# Patient Record
Sex: MALE | Race: OTHER | HISPANIC OR LATINO | Employment: UNEMPLOYED | URBAN - NONMETROPOLITAN AREA
[De-identification: names, ages, dates, MRNs, and addresses within clinical notes are randomized per-mention and may not be internally consistent; named-entity substitution may affect disease eponyms.]

---

## 2024-06-11 ENCOUNTER — HOSPITAL ENCOUNTER (INPATIENT)
Facility: HOSPITAL | Age: 39
LOS: 4 days | Discharge: HOME | DRG: 415 | End: 2024-06-15
Attending: STUDENT IN AN ORGANIZED HEALTH CARE EDUCATION/TRAINING PROGRAM | Admitting: STUDENT IN AN ORGANIZED HEALTH CARE EDUCATION/TRAINING PROGRAM
Payer: MEDICAID

## 2024-06-11 ENCOUNTER — ANESTHESIA (OUTPATIENT)
Dept: SURGERY | Facility: HOSPITAL | Age: 39
DRG: 415 | End: 2024-06-11
Payer: MEDICAID

## 2024-06-11 ENCOUNTER — ANESTHESIA EVENT (OUTPATIENT)
Dept: SURGERY | Facility: HOSPITAL | Age: 39
DRG: 415 | End: 2024-06-11
Payer: MEDICAID

## 2024-06-11 DIAGNOSIS — E87.6 HYPOKALEMIA: ICD-10-CM

## 2024-06-11 DIAGNOSIS — E83.39 HYPOPHOSPHATEMIA: ICD-10-CM

## 2024-06-11 DIAGNOSIS — R79.89 ELEVATED LFTS: ICD-10-CM

## 2024-06-11 DIAGNOSIS — K82.A2 CHOLECYSTITIS WITH PERFORATION OF GALLBLADDER: Primary | ICD-10-CM

## 2024-06-11 DIAGNOSIS — K81.9 CHOLECYSTITIS: ICD-10-CM

## 2024-06-11 DIAGNOSIS — E11.9 TYPE 2 DIABETES MELLITUS WITHOUT COMPLICATION, WITHOUT LONG-TERM CURRENT USE OF INSULIN: ICD-10-CM

## 2024-06-11 PROBLEM — K82.A1 CHOLECYSTITIS WITH GANGRENE OF GALLBLADDER: Status: ACTIVE | Noted: 2024-06-11

## 2024-06-11 LAB
ALBUMIN SERPL BCP-MCNC: 2 G/DL (ref 3.5–5.2)
ALBUMIN SERPL BCP-MCNC: 3.2 G/DL (ref 3.5–5.2)
ALP SERPL-CCNC: 121 U/L (ref 55–135)
ALP SERPL-CCNC: 144 U/L (ref 55–135)
ALT SERPL W/O P-5'-P-CCNC: 240 U/L (ref 10–44)
ALT SERPL W/O P-5'-P-CCNC: 62 U/L (ref 10–44)
ANION GAP SERPL CALC-SCNC: 5 MMOL/L (ref 3–11)
ANION GAP SERPL CALC-SCNC: 7 MMOL/L (ref 3–11)
APTT PPP: 25.5 SEC (ref 21–32)
AST SERPL-CCNC: 17 U/L (ref 10–40)
AST SERPL-CCNC: 316 U/L (ref 10–40)
BACTERIA #/AREA URNS HPF: NEGATIVE /HPF
BASOPHILS # BLD AUTO: 0.02 K/UL (ref 0–0.2)
BASOPHILS NFR BLD: 0.3 % (ref 0–1.9)
BILIRUB SERPL-MCNC: 1.2 MG/DL (ref 0.1–1)
BILIRUB SERPL-MCNC: 1.3 MG/DL (ref 0.1–1)
BILIRUB UR QL STRIP: NEGATIVE
BUN SERPL-MCNC: 12 MG/DL (ref 6–20)
BUN SERPL-MCNC: 16 MG/DL (ref 6–20)
CALCIUM SERPL-MCNC: 6.6 MG/DL (ref 8.7–10.5)
CALCIUM SERPL-MCNC: 9.3 MG/DL (ref 8.7–10.5)
CHLORIDE SERPL-SCNC: 110 MMOL/L (ref 95–110)
CHLORIDE SERPL-SCNC: 96 MMOL/L (ref 95–110)
CLARITY UR: CLEAR
CO2 SERPL-SCNC: 20 MMOL/L (ref 23–29)
CO2 SERPL-SCNC: 26 MMOL/L (ref 23–29)
COLOR UR: YELLOW
CREAT SERPL-MCNC: 1 MG/DL (ref 0.5–1.4)
CREAT SERPL-MCNC: 1.2 MG/DL (ref 0.5–1.4)
DIFFERENTIAL METHOD BLD: ABNORMAL
EOSINOPHIL # BLD AUTO: 0 K/UL (ref 0–0.5)
EOSINOPHIL NFR BLD: 0.1 % (ref 0–8)
ERYTHROCYTE [DISTWIDTH] IN BLOOD BY AUTOMATED COUNT: 12.6 % (ref 11.5–14.5)
ERYTHROCYTE [DISTWIDTH] IN BLOOD BY AUTOMATED COUNT: 13.2 % (ref 11.5–14.5)
EST. GFR  (NO RACE VARIABLE): >60 ML/MIN/1.73 M^2
EST. GFR  (NO RACE VARIABLE): >60 ML/MIN/1.73 M^2
ESTIMATED AVG GLUCOSE: 266 MG/DL (ref 68–131)
GLUCOSE SERPL-MCNC: 323 MG/DL (ref 70–110)
GLUCOSE SERPL-MCNC: 333 MG/DL (ref 70–110)
GLUCOSE UR QL STRIP: ABNORMAL
HBA1C MFR BLD: 10.9 % (ref 4–5.6)
HCT VFR BLD AUTO: 37.1 % (ref 40–54)
HCT VFR BLD AUTO: 47.5 % (ref 40–54)
HGB BLD-MCNC: 12.6 G/DL (ref 14–18)
HGB BLD-MCNC: 16.4 G/DL (ref 14–18)
HGB UR QL STRIP: ABNORMAL
HYALINE CASTS #/AREA URNS LPF: 1.5 /LPF
IMM GRANULOCYTES # BLD AUTO: 0.14 K/UL (ref 0–0.04)
IMM GRANULOCYTES NFR BLD AUTO: 1.9 % (ref 0–0.5)
KETONES UR QL STRIP: ABNORMAL
LACTATE SERPL-SCNC: 1.4 MMOL/L (ref 0.5–2.2)
LEUKOCYTE ESTERASE UR QL STRIP: NEGATIVE
LIPASE SERPL-CCNC: 23 U/L (ref 13–75)
LYMPHOCYTES # BLD AUTO: 1.4 K/UL (ref 1–4.8)
LYMPHOCYTES NFR BLD: 18 % (ref 18–48)
MAGNESIUM SERPL-MCNC: 1.7 MG/DL (ref 1.6–2.6)
MCH RBC QN AUTO: 27.6 PG (ref 27–31)
MCH RBC QN AUTO: 27.8 PG (ref 27–31)
MCHC RBC AUTO-ENTMCNC: 34 G/DL (ref 32–36)
MCHC RBC AUTO-ENTMCNC: 34.5 G/DL (ref 32–36)
MCV RBC AUTO: 80 FL (ref 82–98)
MCV RBC AUTO: 82 FL (ref 82–98)
MICROSCOPIC COMMENT: ABNORMAL
MONOCYTES # BLD AUTO: 0.4 K/UL (ref 0.3–1)
MONOCYTES NFR BLD: 5.7 % (ref 4–15)
NEUTROPHILS # BLD AUTO: 5.5 K/UL (ref 1.8–7.7)
NEUTROPHILS NFR BLD: 74 % (ref 38–73)
NITRITE UR QL STRIP: NEGATIVE
NRBC BLD-RTO: 0 /100 WBC
PH UR STRIP: 6 [PH] (ref 5–8)
PHOSPHATE SERPL-MCNC: 2.5 MG/DL (ref 2.7–4.5)
PLATELET # BLD AUTO: 177 K/UL (ref 150–450)
PLATELET # BLD AUTO: 230 K/UL (ref 150–450)
PMV BLD AUTO: 10.6 FL (ref 9.2–12.9)
PMV BLD AUTO: 10.7 FL (ref 9.2–12.9)
POCT GLUCOSE: 295 MG/DL (ref 70–110)
POCT GLUCOSE: 313 MG/DL (ref 70–110)
POTASSIUM SERPL-SCNC: 4 MMOL/L (ref 3.5–5.1)
POTASSIUM SERPL-SCNC: 5.4 MMOL/L (ref 3.5–5.1)
PROT SERPL-MCNC: 5.7 G/DL (ref 6–8.4)
PROT SERPL-MCNC: 8.6 G/DL (ref 6–8.4)
PROT UR QL STRIP: ABNORMAL
RBC # BLD AUTO: 4.53 M/UL (ref 4.6–6.2)
RBC # BLD AUTO: 5.95 M/UL (ref 4.6–6.2)
RBC #/AREA URNS HPF: 3 /HPF (ref 0–4)
SODIUM SERPL-SCNC: 129 MMOL/L (ref 136–145)
SODIUM SERPL-SCNC: 135 MMOL/L (ref 136–145)
SP GR UR STRIP: >=1.03 (ref 1–1.03)
SQUAMOUS #/AREA URNS HPF: 1 /HPF
URN SPEC COLLECT METH UR: ABNORMAL
UROBILINOGEN UR STRIP-ACNC: 1 EU/DL
WBC # BLD AUTO: 10.65 K/UL (ref 3.9–12.7)
WBC # BLD AUTO: 7.48 K/UL (ref 3.9–12.7)
WBC #/AREA URNS HPF: 4 /HPF (ref 0–5)
YEAST URNS QL MICRO: ABNORMAL

## 2024-06-11 PROCEDURE — 63600175 PHARM REV CODE 636 W HCPCS: Performed by: STUDENT IN AN ORGANIZED HEALTH CARE EDUCATION/TRAINING PROGRAM

## 2024-06-11 PROCEDURE — 87075 CULTR BACTERIA EXCEPT BLOOD: CPT | Performed by: STUDENT IN AN ORGANIZED HEALTH CARE EDUCATION/TRAINING PROGRAM

## 2024-06-11 PROCEDURE — 83036 HEMOGLOBIN GLYCOSYLATED A1C: CPT | Performed by: STUDENT IN AN ORGANIZED HEALTH CARE EDUCATION/TRAINING PROGRAM

## 2024-06-11 PROCEDURE — 96375 TX/PRO/DX INJ NEW DRUG ADDON: CPT

## 2024-06-11 PROCEDURE — 25000003 PHARM REV CODE 250: Performed by: STUDENT IN AN ORGANIZED HEALTH CARE EDUCATION/TRAINING PROGRAM

## 2024-06-11 PROCEDURE — G0378 HOSPITAL OBSERVATION PER HR: HCPCS

## 2024-06-11 PROCEDURE — 96372 THER/PROPH/DIAG INJ SC/IM: CPT | Mod: 59 | Performed by: STUDENT IN AN ORGANIZED HEALTH CARE EDUCATION/TRAINING PROGRAM

## 2024-06-11 PROCEDURE — 85025 COMPLETE CBC W/AUTO DIFF WBC: CPT | Performed by: STUDENT IN AN ORGANIZED HEALTH CARE EDUCATION/TRAINING PROGRAM

## 2024-06-11 PROCEDURE — 0FB40ZZ EXCISION OF GALLBLADDER, OPEN APPROACH: ICD-10-PCS | Performed by: STUDENT IN AN ORGANIZED HEALTH CARE EDUCATION/TRAINING PROGRAM

## 2024-06-11 PROCEDURE — 36415 COLL VENOUS BLD VENIPUNCTURE: CPT | Performed by: STUDENT IN AN ORGANIZED HEALTH CARE EDUCATION/TRAINING PROGRAM

## 2024-06-11 PROCEDURE — 85027 COMPLETE CBC AUTOMATED: CPT | Performed by: STUDENT IN AN ORGANIZED HEALTH CARE EDUCATION/TRAINING PROGRAM

## 2024-06-11 PROCEDURE — 37000009 HC ANESTHESIA EA ADD 15 MINS: Performed by: STUDENT IN AN ORGANIZED HEALTH CARE EDUCATION/TRAINING PROGRAM

## 2024-06-11 PROCEDURE — 37000008 HC ANESTHESIA 1ST 15 MINUTES: Performed by: STUDENT IN AN ORGANIZED HEALTH CARE EDUCATION/TRAINING PROGRAM

## 2024-06-11 PROCEDURE — 36000708 HC OR TIME LEV III 1ST 15 MIN: Performed by: STUDENT IN AN ORGANIZED HEALTH CARE EDUCATION/TRAINING PROGRAM

## 2024-06-11 PROCEDURE — C1729 CATH, DRAINAGE: HCPCS | Performed by: STUDENT IN AN ORGANIZED HEALTH CARE EDUCATION/TRAINING PROGRAM

## 2024-06-11 PROCEDURE — 87070 CULTURE OTHR SPECIMN AEROBIC: CPT | Performed by: STUDENT IN AN ORGANIZED HEALTH CARE EDUCATION/TRAINING PROGRAM

## 2024-06-11 PROCEDURE — 99285 EMERGENCY DEPT VISIT HI MDM: CPT | Mod: 25

## 2024-06-11 PROCEDURE — 85730 THROMBOPLASTIN TIME PARTIAL: CPT | Performed by: STUDENT IN AN ORGANIZED HEALTH CARE EDUCATION/TRAINING PROGRAM

## 2024-06-11 PROCEDURE — 87077 CULTURE AEROBIC IDENTIFY: CPT | Performed by: STUDENT IN AN ORGANIZED HEALTH CARE EDUCATION/TRAINING PROGRAM

## 2024-06-11 PROCEDURE — 83605 ASSAY OF LACTIC ACID: CPT | Performed by: STUDENT IN AN ORGANIZED HEALTH CARE EDUCATION/TRAINING PROGRAM

## 2024-06-11 PROCEDURE — 87186 SC STD MICRODIL/AGAR DIL: CPT | Performed by: STUDENT IN AN ORGANIZED HEALTH CARE EDUCATION/TRAINING PROGRAM

## 2024-06-11 PROCEDURE — 25000003 PHARM REV CODE 250: Performed by: NURSE ANESTHETIST, CERTIFIED REGISTERED

## 2024-06-11 PROCEDURE — 83690 ASSAY OF LIPASE: CPT | Performed by: STUDENT IN AN ORGANIZED HEALTH CARE EDUCATION/TRAINING PROGRAM

## 2024-06-11 PROCEDURE — 25500020 PHARM REV CODE 255: Performed by: STUDENT IN AN ORGANIZED HEALTH CARE EDUCATION/TRAINING PROGRAM

## 2024-06-11 PROCEDURE — 27201423 OPTIME MED/SURG SUP & DEVICES STERILE SUPPLY: Performed by: STUDENT IN AN ORGANIZED HEALTH CARE EDUCATION/TRAINING PROGRAM

## 2024-06-11 PROCEDURE — 81000 URINALYSIS NONAUTO W/SCOPE: CPT | Performed by: STUDENT IN AN ORGANIZED HEALTH CARE EDUCATION/TRAINING PROGRAM

## 2024-06-11 PROCEDURE — 84100 ASSAY OF PHOSPHORUS: CPT | Performed by: STUDENT IN AN ORGANIZED HEALTH CARE EDUCATION/TRAINING PROGRAM

## 2024-06-11 PROCEDURE — 83735 ASSAY OF MAGNESIUM: CPT | Performed by: STUDENT IN AN ORGANIZED HEALTH CARE EDUCATION/TRAINING PROGRAM

## 2024-06-11 PROCEDURE — 80053 COMPREHEN METABOLIC PANEL: CPT | Mod: 91 | Performed by: STUDENT IN AN ORGANIZED HEALTH CARE EDUCATION/TRAINING PROGRAM

## 2024-06-11 PROCEDURE — 63600175 PHARM REV CODE 636 W HCPCS: Performed by: NURSE ANESTHETIST, CERTIFIED REGISTERED

## 2024-06-11 PROCEDURE — 47600 CHOLECYSTECTOMY: CPT | Mod: 52,,, | Performed by: STUDENT IN AN ORGANIZED HEALTH CARE EDUCATION/TRAINING PROGRAM

## 2024-06-11 PROCEDURE — 20000000 HC ICU ROOM

## 2024-06-11 PROCEDURE — 80053 COMPREHEN METABOLIC PANEL: CPT | Performed by: STUDENT IN AN ORGANIZED HEALTH CARE EDUCATION/TRAINING PROGRAM

## 2024-06-11 PROCEDURE — 36000709 HC OR TIME LEV III EA ADD 15 MIN: Performed by: STUDENT IN AN ORGANIZED HEALTH CARE EDUCATION/TRAINING PROGRAM

## 2024-06-11 PROCEDURE — 96374 THER/PROPH/DIAG INJ IV PUSH: CPT

## 2024-06-11 RX ORDER — DIPHENHYDRAMINE HYDROCHLORIDE 50 MG/ML
25 INJECTION INTRAMUSCULAR; INTRAVENOUS EVERY 6 HOURS PRN
OUTPATIENT
Start: 2024-06-11

## 2024-06-11 RX ORDER — HYDROMORPHONE HYDROCHLORIDE 2 MG/ML
0.5 INJECTION, SOLUTION INTRAMUSCULAR; INTRAVENOUS; SUBCUTANEOUS EVERY 5 MIN PRN
OUTPATIENT
Start: 2024-06-11

## 2024-06-11 RX ORDER — ACETAMINOPHEN 10 MG/ML
INJECTION, SOLUTION INTRAVENOUS
Status: DISCONTINUED | OUTPATIENT
Start: 2024-06-11 | End: 2024-06-12

## 2024-06-11 RX ORDER — HYDROMORPHONE HYDROCHLORIDE 1 MG/ML
0.5 INJECTION, SOLUTION INTRAMUSCULAR; INTRAVENOUS; SUBCUTANEOUS EVERY 4 HOURS PRN
Status: DISCONTINUED | OUTPATIENT
Start: 2024-06-11 | End: 2024-06-12

## 2024-06-11 RX ORDER — MORPHINE SULFATE 4 MG/ML
4 INJECTION, SOLUTION INTRAMUSCULAR; INTRAVENOUS
Status: COMPLETED | OUTPATIENT
Start: 2024-06-11 | End: 2024-06-11

## 2024-06-11 RX ORDER — SODIUM CHLORIDE 9 MG/ML
INJECTION, SOLUTION INTRAVENOUS CONTINUOUS
OUTPATIENT
Start: 2024-06-11

## 2024-06-11 RX ORDER — PROCHLORPERAZINE EDISYLATE 5 MG/ML
5 INJECTION INTRAMUSCULAR; INTRAVENOUS EVERY 6 HOURS PRN
Status: DISCONTINUED | OUTPATIENT
Start: 2024-06-11 | End: 2024-06-15 | Stop reason: HOSPADM

## 2024-06-11 RX ORDER — METHOCARBAMOL 500 MG/1
1000 TABLET, FILM COATED ORAL 4 TIMES DAILY
Status: DISCONTINUED | OUTPATIENT
Start: 2024-06-11 | End: 2024-06-15 | Stop reason: HOSPADM

## 2024-06-11 RX ORDER — DEXTROSE MONOHYDRATE 50 MG/ML
INJECTION, SOLUTION INTRAVENOUS
Status: DISCONTINUED | OUTPATIENT
Start: 2024-06-11 | End: 2024-06-15 | Stop reason: HOSPADM

## 2024-06-11 RX ORDER — FENTANYL CITRATE 50 UG/ML
INJECTION, SOLUTION INTRAMUSCULAR; INTRAVENOUS
Status: DISCONTINUED | OUTPATIENT
Start: 2024-06-11 | End: 2024-06-12

## 2024-06-11 RX ORDER — NEOSTIGMINE METHYLSULFATE 5 MG/5 ML
SYRINGE (ML) INTRAVENOUS
Status: DISCONTINUED | OUTPATIENT
Start: 2024-06-11 | End: 2024-06-12

## 2024-06-11 RX ORDER — GLYCOPYRROLATE 0.2 MG/ML
INJECTION, SOLUTION INTRAMUSCULAR; INTRAVENOUS
Status: DISCONTINUED | OUTPATIENT
Start: 2024-06-11 | End: 2024-06-12

## 2024-06-11 RX ORDER — SODIUM CHLORIDE 0.9 % (FLUSH) 0.9 %
10 SYRINGE (ML) INJECTION
Status: DISCONTINUED | OUTPATIENT
Start: 2024-06-11 | End: 2024-06-15 | Stop reason: HOSPADM

## 2024-06-11 RX ORDER — HYDROMORPHONE HYDROCHLORIDE 1 MG/ML
1 INJECTION, SOLUTION INTRAMUSCULAR; INTRAVENOUS; SUBCUTANEOUS EVERY 4 HOURS PRN
Status: DISCONTINUED | OUTPATIENT
Start: 2024-06-11 | End: 2024-06-12

## 2024-06-11 RX ORDER — MORPHINE SULFATE 4 MG/ML
4 INJECTION, SOLUTION INTRAMUSCULAR; INTRAVENOUS EVERY 5 MIN PRN
OUTPATIENT
Start: 2024-06-11

## 2024-06-11 RX ORDER — ONDANSETRON HYDROCHLORIDE 2 MG/ML
INJECTION, SOLUTION INTRAMUSCULAR; INTRAVENOUS
Status: DISCONTINUED | OUTPATIENT
Start: 2024-06-11 | End: 2024-06-12

## 2024-06-11 RX ORDER — ONDANSETRON HYDROCHLORIDE 2 MG/ML
4 INJECTION, SOLUTION INTRAVENOUS
Status: COMPLETED | OUTPATIENT
Start: 2024-06-11 | End: 2024-06-11

## 2024-06-11 RX ORDER — ONDANSETRON HYDROCHLORIDE 2 MG/ML
4 INJECTION, SOLUTION INTRAVENOUS EVERY 8 HOURS PRN
Status: DISCONTINUED | OUTPATIENT
Start: 2024-06-11 | End: 2024-06-15 | Stop reason: HOSPADM

## 2024-06-11 RX ORDER — FAMOTIDINE 10 MG/ML
20 INJECTION INTRAVENOUS EVERY 12 HOURS
Status: DISCONTINUED | OUTPATIENT
Start: 2024-06-11 | End: 2024-06-15 | Stop reason: HOSPADM

## 2024-06-11 RX ORDER — HYDROMORPHONE HYDROCHLORIDE 2 MG/ML
INJECTION, SOLUTION INTRAMUSCULAR; INTRAVENOUS; SUBCUTANEOUS
Status: DISCONTINUED | OUTPATIENT
Start: 2024-06-11 | End: 2024-06-12

## 2024-06-11 RX ORDER — PROPOFOL 10 MG/ML
VIAL (ML) INTRAVENOUS
Status: DISCONTINUED | OUTPATIENT
Start: 2024-06-11 | End: 2024-06-12

## 2024-06-11 RX ORDER — ROCURONIUM BROMIDE 10 MG/ML
INJECTION, SOLUTION INTRAVENOUS
Status: DISCONTINUED | OUTPATIENT
Start: 2024-06-11 | End: 2024-06-12

## 2024-06-11 RX ORDER — INSULIN ASPART 100 [IU]/ML
0-5 INJECTION, SOLUTION INTRAVENOUS; SUBCUTANEOUS EVERY 6 HOURS PRN
Status: DISCONTINUED | OUTPATIENT
Start: 2024-06-11 | End: 2024-06-12

## 2024-06-11 RX ORDER — ONDANSETRON HYDROCHLORIDE 2 MG/ML
4 INJECTION, SOLUTION INTRAVENOUS DAILY PRN
OUTPATIENT
Start: 2024-06-11

## 2024-06-11 RX ORDER — TALC
6 POWDER (GRAM) TOPICAL NIGHTLY PRN
Status: DISCONTINUED | OUTPATIENT
Start: 2024-06-11 | End: 2024-06-15 | Stop reason: HOSPADM

## 2024-06-11 RX ORDER — GLUCAGON 1 MG
1 KIT INJECTION
Status: DISCONTINUED | OUTPATIENT
Start: 2024-06-11 | End: 2024-06-12

## 2024-06-11 RX ORDER — SODIUM CHLORIDE 9 MG/ML
INJECTION, SOLUTION INTRAVENOUS CONTINUOUS
Status: DISCONTINUED | OUTPATIENT
Start: 2024-06-11 | End: 2024-06-14

## 2024-06-11 RX ADMIN — HYDROMORPHONE HYDROCHLORIDE 1 MG: 1 INJECTION, SOLUTION INTRAMUSCULAR; INTRAVENOUS; SUBCUTANEOUS at 08:06

## 2024-06-11 RX ADMIN — IOHEXOL 100 ML: 350 INJECTION, SOLUTION INTRAVENOUS at 05:06

## 2024-06-11 RX ADMIN — ONDANSETRON 4 MG: 2 INJECTION INTRAMUSCULAR; INTRAVENOUS at 05:06

## 2024-06-11 RX ADMIN — SODIUM CHLORIDE: 9 INJECTION, SOLUTION INTRAVENOUS at 02:06

## 2024-06-11 RX ADMIN — SODIUM CHLORIDE: 9 INJECTION, SOLUTION INTRAVENOUS at 05:06

## 2024-06-11 RX ADMIN — INSULIN ASPART 4 UNITS: 100 INJECTION, SOLUTION INTRAVENOUS; SUBCUTANEOUS at 11:06

## 2024-06-11 RX ADMIN — DEXTROSE MONOHYDRATE: 50 INJECTION, SOLUTION INTRAVENOUS at 06:06

## 2024-06-11 RX ADMIN — ROCURONIUM BROMIDE 20 MG: 10 INJECTION, SOLUTION INTRAVENOUS at 03:06

## 2024-06-11 RX ADMIN — ROCURONIUM BROMIDE 20 MG: 10 INJECTION, SOLUTION INTRAVENOUS at 06:06

## 2024-06-11 RX ADMIN — FAMOTIDINE 20 MG: 10 INJECTION, SOLUTION INTRAVENOUS at 08:06

## 2024-06-11 RX ADMIN — ROCURONIUM BROMIDE 30 MG: 10 INJECTION, SOLUTION INTRAVENOUS at 01:06

## 2024-06-11 RX ADMIN — SODIUM CHLORIDE: 9 INJECTION, SOLUTION INTRAVENOUS at 12:06

## 2024-06-11 RX ADMIN — ROCURONIUM BROMIDE 30 MG: 10 INJECTION, SOLUTION INTRAVENOUS at 02:06

## 2024-06-11 RX ADMIN — FENTANYL CITRATE 100 MCG: 50 INJECTION, SOLUTION INTRAMUSCULAR; INTRAVENOUS at 01:06

## 2024-06-11 RX ADMIN — HYDROMORPHONE HYDROCHLORIDE 1 MG: 1 INJECTION, SOLUTION INTRAMUSCULAR; INTRAVENOUS; SUBCUTANEOUS at 07:06

## 2024-06-11 RX ADMIN — INSULIN ASPART 3 UNITS: 100 INJECTION, SOLUTION INTRAVENOUS; SUBCUTANEOUS at 07:06

## 2024-06-11 RX ADMIN — ACETAMINOPHEN 1000 MG: 10 INJECTION, SOLUTION INTRAVENOUS at 02:06

## 2024-06-11 RX ADMIN — SODIUM CHLORIDE: 9 INJECTION, SOLUTION INTRAVENOUS at 01:06

## 2024-06-11 RX ADMIN — SODIUM CHLORIDE: 9 INJECTION, SOLUTION INTRAVENOUS at 09:06

## 2024-06-11 RX ADMIN — SODIUM CHLORIDE: 9 INJECTION, SOLUTION INTRAVENOUS at 08:06

## 2024-06-11 RX ADMIN — SODIUM CHLORIDE: 9 INJECTION, SOLUTION INTRAVENOUS at 03:06

## 2024-06-11 RX ADMIN — FENTANYL CITRATE 50 MCG: 50 INJECTION, SOLUTION INTRAMUSCULAR; INTRAVENOUS at 03:06

## 2024-06-11 RX ADMIN — GLYCOPYRROLATE 0.8 MG: 0.2 INJECTION, SOLUTION INTRAMUSCULAR; INTRAVENOUS at 07:06

## 2024-06-11 RX ADMIN — FENTANYL CITRATE 50 MCG: 50 INJECTION, SOLUTION INTRAMUSCULAR; INTRAVENOUS at 12:06

## 2024-06-11 RX ADMIN — ROCURONIUM BROMIDE 40 MG: 10 INJECTION, SOLUTION INTRAVENOUS at 12:06

## 2024-06-11 RX ADMIN — ONDANSETRON 4 MG: 2 INJECTION INTRAMUSCULAR; INTRAVENOUS at 12:06

## 2024-06-11 RX ADMIN — SODIUM CHLORIDE 1000 ML: 9 INJECTION, SOLUTION INTRAVENOUS at 05:06

## 2024-06-11 RX ADMIN — ROCURONIUM BROMIDE 20 MG: 10 INJECTION, SOLUTION INTRAVENOUS at 05:06

## 2024-06-11 RX ADMIN — Medication 200 MG: at 12:06

## 2024-06-11 RX ADMIN — Medication 5 MG: at 07:06

## 2024-06-11 RX ADMIN — ROCURONIUM BROMIDE 20 MG: 10 INJECTION, SOLUTION INTRAVENOUS at 04:06

## 2024-06-11 RX ADMIN — HYDROMORPHONE HYDROCHLORIDE 0.5 MG: 2 INJECTION, SOLUTION INTRAMUSCULAR; INTRAVENOUS; SUBCUTANEOUS at 05:06

## 2024-06-11 RX ADMIN — MORPHINE SULFATE 4 MG: 4 INJECTION INTRAVENOUS at 05:06

## 2024-06-11 RX ADMIN — PIPERACILLIN SODIUM AND TAZOBACTAM SODIUM 4.5 G: 4; .5 INJECTION, POWDER, FOR SOLUTION INTRAVENOUS at 06:06

## 2024-06-11 RX ADMIN — METHOCARBAMOL 1000 MG: 500 TABLET ORAL at 08:06

## 2024-06-11 RX ADMIN — HYDROMORPHONE HYDROCHLORIDE 1 MG: 1 INJECTION, SOLUTION INTRAMUSCULAR; INTRAVENOUS; SUBCUTANEOUS at 10:06

## 2024-06-11 RX ADMIN — PIPERACILLIN SODIUM AND TAZOBACTAM SODIUM 4.5 G: 4; .5 INJECTION, POWDER, FOR SOLUTION INTRAVENOUS at 02:06

## 2024-06-11 RX ADMIN — FENTANYL CITRATE 100 MCG: 50 INJECTION, SOLUTION INTRAMUSCULAR; INTRAVENOUS at 12:06

## 2024-06-11 RX ADMIN — HYDROMORPHONE HYDROCHLORIDE 0.5 MG: 2 INJECTION, SOLUTION INTRAMUSCULAR; INTRAVENOUS; SUBCUTANEOUS at 06:06

## 2024-06-11 RX ADMIN — FENTANYL CITRATE 100 MCG: 50 INJECTION, SOLUTION INTRAMUSCULAR; INTRAVENOUS at 02:06

## 2024-06-11 RX ADMIN — PIPERACILLIN SODIUM AND TAZOBACTAM SODIUM 4.5 G: 4; .5 INJECTION, POWDER, FOR SOLUTION INTRAVENOUS at 10:06

## 2024-06-11 RX ADMIN — ROCURONIUM BROMIDE 30 MG: 10 INJECTION, SOLUTION INTRAVENOUS at 12:06

## 2024-06-11 NOTE — ED PROVIDER NOTES
History  Chief Complaint   Patient presents with    Abdominal Pain     Patient reports 10/10, sharp, RLQ pain onset 2 hours ago. Denies N/V/D.      39-year-old male with history of gastritis presents for evaluation of severe right lower quadrant abdominal pain.  Pain rated 10 of 10. Omeprazole taken for symptom relief prior to arrival with no improvement.  No history of abdominal surgeries reported.  All other systems reviewed unremarkable        Past Medical History:   Diagnosis Date    Gastritis        History reviewed. No pertinent surgical history.    No family history on file.         ROS  Review of Systems   Gastrointestinal:  Positive for abdominal pain.       Physical Exam  /76 (BP Location: Left arm, Patient Position: Sitting)   Pulse 88   Temp 99.5 °F (37.5 °C) (Oral)   Resp 16   Wt 106.1 kg (234 lb)   SpO2 95%   Physical Exam    Constitutional: He appears well-developed and well-nourished. He is cooperative.   HENT:   Head: Normocephalic and atraumatic.   Eyes: Conjunctivae, EOM and lids are normal. Pupils are equal, round, and reactive to light.   Neck: Phonation normal.   Normal range of motion.  Cardiovascular:  Normal rate, regular rhythm and intact distal pulses.           Abdominal: There is abdominal tenderness.   Genitourinary:    Testes and penis normal.   Cremasteric reflex is present. Right testis shows no tenderness. Left testis shows no tenderness. No discharge found.   Musculoskeletal:      Cervical back: Normal range of motion.     Neurological: He is alert and oriented to person, place, and time.   Skin: Skin is warm and dry.   Psychiatric: He has a normal mood and affect. His speech is normal and behavior is normal.               Labs Reviewed   CBC W/ AUTO DIFFERENTIAL - Abnormal; Notable for the following components:       Result Value    MCV 80 (*)     Immature Granulocytes 1.9 (*)     Immature Grans (Abs) 0.14 (*)     Gran % 74.0 (*)     All other components within normal  limits   COMPREHENSIVE METABOLIC PANEL - Abnormal; Notable for the following components:    Sodium 129 (*)     Glucose 333 (*)     Total Protein 8.6 (*)     Albumin 3.2 (*)     Total Bilirubin 1.2 (*)     Alkaline Phosphatase 144 (*)     ALT 62 (*)     All other components within normal limits   LIPASE   URINALYSIS, REFLEX TO URINE CULTURE           Imaging Results              CT Abdomen Pelvis With IV Contrast NO Oral Contrast (In process)  Result time 06/11/24 05:16:12                                Procedures             Medical Decision Making  Differentials include appendicitis, nephrolithiasis, cystitis, testicular torsion or alternate abnormality.  Will obtain labs and imaging.  See ED course for updates    Amount and/or Complexity of Data Reviewed  Labs: ordered. Decision-making details documented in ED Course.  Radiology: ordered.    Risk  Prescription drug management.               ED Course as of 06/11/24 0608 Tue Jun 11, 2024   0555 BILIRUBIN TOTAL(!): 1.2 [NA]   0555 ALP(!): 144 [NA]   0555 AST: 17 [NA]   0555 ALT(!): 62 [NA]   0608 CT notable for suppurative cholecystitis.  General surgery, Dr. Castellanos was consulted.  Will admit for operative intervention. [NA]      ED Course User Index  [NA] Queenie Leonardo MD       DISCHARGE NOTE:       Justino Fortune's  results have been reviewed with him.  He has been counseled regarding his diagnosis, treatment, and plan.  He verbally conveys understanding and agreement of the signs, symptoms, diagnosis, treatment and prognosis and additionally agrees to follow up as discussed.  He also agrees with the care-plan and conveys that all of his questions have been answered.  I have also provided discharge instructions for him that include: educational information regarding their diagnosis and treatment, and list of reasons why they would want to return to the ED prior to their follow-up appointment, should his condition change. He has been provided with  education for proper emergency department utilization.      CLINICAL IMPRESSION:         1. Cholecystitis              PLAN:   1. Admit  2.   New Prescriptions    No medications on file     3. No follow-up provider specified.        Queenie Leonardo MD  06/11/24 0608

## 2024-06-11 NOTE — SUBJECTIVE & OBJECTIVE
No current facility-administered medications on file prior to encounter.     No current outpatient medications on file prior to encounter.       Review of patient's allergies indicates:  No Known Allergies    Past Medical History:   Diagnosis Date    Gastritis      History reviewed. No pertinent surgical history.  Family History    None       Tobacco Use    Smoking status: Not on file    Smokeless tobacco: Not on file   Substance and Sexual Activity    Alcohol use: Not on file    Drug use: Not on file    Sexual activity: Not on file     Review of Systems   Constitutional:  Negative for activity change, appetite change, chills and fever.   Respiratory:  Negative for chest tightness and shortness of breath.    Cardiovascular:  Negative for chest pain.   Gastrointestinal:  Positive for abdominal pain, nausea and vomiting. Negative for abdominal distention, anal bleeding, blood in stool, constipation, diarrhea and rectal pain.     Objective:     Vital Signs (Most Recent):  Temp: (!) 101.3 °F (38.5 °C) (06/11/24 0923)  Pulse: 103 (06/11/24 0923)  Resp: 20 (06/11/24 0923)  BP: 119/74 (06/11/24 0923)  SpO2: (!) 93 % (06/11/24 0923) Vital Signs (24h Range):  Temp:  [99.5 °F (37.5 °C)-101.3 °F (38.5 °C)] 101.3 °F (38.5 °C)  Pulse:  [] 103  Resp:  [16-20] 20  SpO2:  [93 %-95 %] 93 %  BP: (119-124)/(74-76) 119/74     Weight: 106.1 kg (234 lb)  Body mass index is 35.58 kg/m².     Physical Exam  Vitals reviewed.   Constitutional:       General: He is not in acute distress.     Appearance: He is ill-appearing. He is not toxic-appearing.   Cardiovascular:      Rate and Rhythm: Normal rate and regular rhythm.   Pulmonary:      Effort: Pulmonary effort is normal. No respiratory distress.   Abdominal:      General: There is no distension.      Palpations: Abdomen is soft.      Tenderness: There is abdominal tenderness. There is guarding. There is no rebound.   Musculoskeletal:      Right lower leg: No edema.      Left lower  leg: No edema.   Skin:     General: Skin is warm and dry.      Capillary Refill: Capillary refill takes less than 2 seconds.   Neurological:      Mental Status: He is alert. Mental status is at baseline.            I have reviewed all pertinent lab results within the past 24 hours.  CBC:   Recent Labs   Lab 06/11/24  0511   WBC 7.48   RBC 5.95   HGB 16.4   HCT 47.5      MCV 80*   MCH 27.6   MCHC 34.5     CMP:   Recent Labs   Lab 06/11/24  0511   *   CALCIUM 9.3   ALBUMIN 3.2*   PROT 8.6*   *   K 4.0   CO2 26   CL 96   BUN 12   CREATININE 1.2   ALKPHOS 144*   ALT 62*   AST 17   BILITOT 1.2*       Significant Diagnostics:  I have reviewed all pertinent imaging results/findings within the past 24 hours.

## 2024-06-11 NOTE — ANESTHESIA PREPROCEDURE EVALUATION
06/11/2024  Justino Fortune is a 39 y.o., male.      Pre-op Assessment    I have reviewed the Patient Summary Reports.    I have reviewed the NPO Status.   I have reviewed the Medications.     Review of Systems  Anesthesia Hx:  No problems with previous Anesthesia             Denies Family Hx of Anesthesia complications.    Denies Personal Hx of Anesthesia complications.                    Social:  Non-Smoker       Cardiovascular:  Cardiovascular Normal                                            Pulmonary:  Pulmonary Normal                       Renal/:  Renal/ Normal                 Hepatic/GI:  Hepatic/GI Normal       HX GAstritis          Neurological:  Neurology Normal                                      Endocrine:  Endocrine Normal              Lab Results   Component Value Date    WBC 7.48 06/11/2024    HGB 16.4 06/11/2024    HCT 47.5 06/11/2024    MCV 80 (L) 06/11/2024     06/11/2024      CMP  Sodium   Date Value Ref Range Status   06/11/2024 129 (L) 136 - 145 mmol/L Final     Potassium   Date Value Ref Range Status   06/11/2024 4.0 3.5 - 5.1 mmol/L Final     Chloride   Date Value Ref Range Status   06/11/2024 96 95 - 110 mmol/L Final     CO2   Date Value Ref Range Status   06/11/2024 26 23 - 29 mmol/L Final     Glucose   Date Value Ref Range Status   06/11/2024 333 (H) 70 - 110 mg/dL Final     BUN   Date Value Ref Range Status   06/11/2024 12 6 - 20 mg/dL Final     Creatinine   Date Value Ref Range Status   06/11/2024 1.2 0.5 - 1.4 mg/dL Final     Calcium   Date Value Ref Range Status   06/11/2024 9.3 8.7 - 10.5 mg/dL Final     Total Protein   Date Value Ref Range Status   06/11/2024 8.6 (H) 6.0 - 8.4 g/dL Final     Albumin   Date Value Ref Range Status   06/11/2024 3.2 (L) 3.5 - 5.2 g/dL Final     Total Bilirubin   Date Value Ref Range Status   06/11/2024 1.2 (H) 0.1 - 1.0 mg/dL  Final     Comment:     For infants and newborns, interpretation of results should be based  on gestational age, weight and in agreement with clinical  observations.    Premature Infant recommended reference ranges:  Up to 24 hours.............<8.0 mg/dL  Up to 48 hours............<12.0 mg/dL  3-5 days..................<15.0 mg/dL  6-29 days.................<15.0 mg/dL    For patients on Eltrombopag therapy, use of Dimension [place] TBIL is   not   recommended.       Alkaline Phosphatase   Date Value Ref Range Status   06/11/2024 144 (H) 55 - 135 U/L Final     AST   Date Value Ref Range Status   06/11/2024 17 10 - 40 U/L Final     ALT   Date Value Ref Range Status   06/11/2024 62 (H) 10 - 44 U/L Final     Anion Gap   Date Value Ref Range Status   06/11/2024 7 3 - 11 mmol/L Final     eGFR   Date Value Ref Range Status   06/11/2024 >60.0 >60 mL/min/1.73 m^2 Final    iNTERRURTER Kyle-friend       Anesthesia Plan  Type of Anesthesia, risks & benefits discussed:    Anesthesia Type: Gen ETT  Intra-op Monitoring Plan: Standard ASA Monitors  Post Op Pain Control Plan: multimodal analgesia  Induction:  IV  Airway Plan: Direct  Informed Consent: Informed consent signed with the Patient and all parties understand the risks and agree with anesthesia plan.  All questions answered.   ASA Score: 2  Day of Surgery Review of History & Physical: I have interviewed and examined the patient. I have reviewed the patient's H&P dated: There are no significant changes.     Ready For Surgery From Anesthesia Perspective.     .

## 2024-06-11 NOTE — H&P
[facility] Surg  General Surgery  History & Physical    Patient Name: Justino Fortune  MRN: [id number]  Admission Date: 6/11/2024  Attending Physician: Kary Ackerman MD   Primary Care Provider: No primary care provider on file.    Patient information was obtained from patient and ER records.     Subjective:     Chief Complaint/Reason for Admission: acute suppurative cholecystitis     History of Present Illness: 39-year-old male who presents with 2 day history of severe right upper quadrant pain and nausea.  Has history of self-reported gastritis but thinks it may have been his gallbladder.  In emergency department, CT abdomen and pelvis demonstrated supra active cholecystitis with inflammation of the adjacent liver.  WBC within normal limits.  T bili 1.2.  ALP elevated; glucose 333 - no reported history of diabetes.  General surgery consulted for evaluation.    No current facility-administered medications on file prior to encounter.     No current outpatient medications on file prior to encounter.       Review of patient's allergies indicates:  No Known Allergies    Past Medical History:   Diagnosis Date    Gastritis      History reviewed. No pertinent surgical history.  Family History    None       Tobacco Use    Smoking status: Not on file    Smokeless tobacco: Not on file   Substance and Sexual Activity    Alcohol use: Not on file    Drug use: Not on file    Sexual activity: Not on file     Review of Systems   Constitutional:  Negative for activity change, appetite change, chills and fever.   Respiratory:  Negative for chest tightness and shortness of breath.    Cardiovascular:  Negative for chest pain.   Gastrointestinal:  Positive for abdominal pain, nausea and vomiting. Negative for abdominal distention, anal bleeding, blood in stool, constipation, diarrhea and rectal pain.     Objective:     Vital Signs (Most Recent):  Temp: (!) 101.3 °F (38.5 °C) (06/11/24 0923)  Pulse: 103 (06/11/24  0923)  Resp: 20 (06/11/24 0923)  BP: 119/74 (06/11/24 0923)  SpO2: (!) 93 % (06/11/24 0923) Vital Signs (24h Range):  Temp:  [99.5 °F (37.5 °C)-101.3 °F (38.5 °C)] 101.3 °F (38.5 °C)  Pulse:  [] 103  Resp:  [16-20] 20  SpO2:  [93 %-95 %] 93 %  BP: (119-124)/(74-76) 119/74     Weight: 106.1 kg (234 lb)  Body mass index is 35.58 kg/m².     Physical Exam  Vitals reviewed.   Constitutional:       General: He is not in acute distress.     Appearance: He is ill-appearing. He is not toxic-appearing.   Cardiovascular:      Rate and Rhythm: Normal rate and regular rhythm.   Pulmonary:      Effort: Pulmonary effort is normal. No respiratory distress.   Abdominal:      General: There is no distension.      Palpations: Abdomen is soft.      Tenderness: There is abdominal tenderness. There is guarding. There is no rebound.   Musculoskeletal:      Right lower leg: No edema.      Left lower leg: No edema.   Skin:     General: Skin is warm and dry.      Capillary Refill: Capillary refill takes less than 2 seconds.   Neurological:      Mental Status: He is alert. Mental status is at baseline.            I have reviewed all pertinent lab results within the past 24 hours.  CBC:   Recent Labs   Lab 06/11/24  0511   WBC 7.48   RBC 5.95   HGB 16.4   HCT 47.5      MCV 80*   MCH 27.6   MCHC 34.5     CMP:   Recent Labs   Lab 06/11/24  0511   *   CALCIUM 9.3   ALBUMIN 3.2*   PROT 8.6*   *   K 4.0   CO2 26   CL 96   BUN 12   CREATININE 1.2   ALKPHOS 144*   ALT 62*   AST 17   BILITOT 1.2*       Significant Diagnostics:  I have reviewed all pertinent imaging results/findings within the past 24 hours.    Assessment/Plan:     Cholecystitis with gangrene of gallbladder  -To OR today for lap vs open cholecystectomy with possible drainage of liver abscess   -IV Zosyn   -NPO   -IVFs  -Multi modal pain control   -A1c  -SSI         VTE Risk Mitigation (From admission, onward)           Ordered     IP VTE LOW RISK PATIENT  Once          06/11/24 0651     Place JORJE hose  Until discontinued         06/11/24 0651                    Kary Ackerman MD  General Surgery  [facility] Surg

## 2024-06-11 NOTE — PLAN OF CARE
[facility] Surg  Initial Discharge Assessment       Primary Care Provider: No primary care provider on file.    Admission Diagnosis: Cholecystitis [K81.9]    Admission Date: 6/11/2024  Expected Discharge Date:     Transition of Care Barriers: Unisured    Payor: /     Extended Emergency Contact Information  Primary Emergency Contact: NNEKA HSU  Mobile Phone: [phone]  Relation: Relative  Preferred language: Greek   needed? No    Discharge Plan A: Home  Discharge Plan B: Home      [id number] - [place], [place] - [address] [facility] [address]  [address]  [facility] [address]  Phone: [phone] Fax: [phone]      Initial Assessment (most recent)       Adult Discharge Assessment - 06/11/24 0912          Discharge Assessment    Assessment Type Discharge Planning Assessment     Confirmed/corrected address, phone number and insurance Yes     Confirmed Demographics Correct on Facesheet     Source of Information patient     When was your last doctors appointment? --   Has been a while    Does patient/caregiver understand observation status Yes     Communicated ROMEO with patient/caregiver Yes     Reason For Admission Abdominal pain     People in Home friend(s)   4 friends living together    Do you expect to return to your current living situation? Yes     Do you have help at home or someone to help you manage your care at home? No     Prior to hospitilization cognitive status: Alert/Oriented     Current cognitive status: Alert/Oriented     Walking or Climbing Stairs Difficulty no   Patient is independent    Dressing/Bathing Difficulty no   Patient is independent    Equipment Currently Used at Home none     Readmission within 30 days? No     Patient currently being followed by outpatient case management? No     Do you currently have service(s) that help you manage your care at home? No     Do you take prescription medications? No     Do you have  prescription coverage? No     Do you have any problems affording any of your prescribed medications? TBD     Who is going to help you get home at discharge? Patient has friends that can assist if needed.     How do you get to doctors appointments? family or friend will provide     Are you on dialysis? No     Do you take coumadin? No     Discharge Plan A Home     Discharge Plan B Home     DME Needed Upon Discharge  none     Discharge Plan discussed with: Patient     Transition of Care Barriers Unisured        Physical Activity    On average, how many days per week do you engage in moderate to strenuous exercise (like a brisk walk)? 5 days     On average, how many minutes do you engage in exercise at this level? 130 min        Financial Resource Strain    How hard is it for you to pay for the very basics like food, housing, medical care, and heating? Somewhat hard   Sometimes he is unemployed.       Housing Stability    In the last 12 months, was there a time when you were not able to pay the mortgage or rent on time? No     At any time in the past 12 months, were you homeless or living in a shelter (including now)? No        Transportation Needs    Has the lack of transportation kept you from medical appointments, meetings, work or from getting things needed for daily living? No        Food Insecurity    Within the past 12 months, you worried that your food would run out before you got the money to buy more. Sometimes true        Stress    Do you feel stress - tense, restless, nervous, or anxious, or unable to sleep at night because your mind is troubled all the time - these days? To some extent        Social Isolation    How often do you feel lonely or isolated from those around you?  Never        Alcohol Use    Q1: How often do you have a drink containing alcohol? Never     Q2: How many drinks containing alcohol do you have on a typical day when you are drinking? Patient does not drink     Q3: How often do you have  six or more drinks on one occasion? Never        Utilities    In the past 12 months has the electric, gas, oil, or water company threatened to shut off services in your home? No        Health Literacy    How often do you need to have someone help you when you read instructions, pamphlets, or other written material from your doctor or pharmacy? Always                      DCP completed at bedside. Patient is independent and sometimes work. Patient has no support other than 3 friends that live with him but do not assist in his  care. Patient is uninsured and with reach out to Medicaid rep to assist in the case.  No other social concerns noted. CM/SW to remain available for any needs. Friend at bedside that is assisting patient is Oliva Dominguez [phone]

## 2024-06-11 NOTE — ANESTHESIA PROCEDURE NOTES
Intubation    Date/Time: 6/11/2024 12:29 PM    Performed by: Flaco Acosta CRNA  Authorized by: Flaco Acosta CRNA    Intubation:     Induction:  Intravenous    Intubated:  Postinduction    Mask Ventilation:  Not attempted    Attempts:  1    Attempted By:  CRNA    Method of Intubation:  Direct    Blade:  Johny 4    Laryngeal View Grade: Grade I - full view of cords      Difficult Airway Encountered?: No      Complications:  None    Airway Device:  Oral endotracheal tube    Airway Device Size:  7.5    Style/Cuff Inflation:  Cuffed    Inflation Amount (mL):  6    Tube secured:  21    Secured at:  The lips    Placement Verified By:  Capnometry    Complicating Factors:  None    Findings Post-Intubation:  BS equal bilateral and atraumatic/condition of teeth unchanged

## 2024-06-11 NOTE — ASSESSMENT & PLAN NOTE
-To OR today for lap vs open cholecystectomy with possible drainage of liver abscess   -IV Zosyn   -NPO   -IVFs  -Multi modal pain control   -A1c  -SSI

## 2024-06-11 NOTE — HPI
39-year-old male who presents with 2 day history of severe right upper quadrant pain and nausea.  Has history of self-reported gastritis but thinks it may have been his gallbladder.  In emergency department, CT abdomen and pelvis demonstrated supra active cholecystitis with inflammation of the adjacent liver.  WBC within normal limits.  T bili 1.2.  ALP elevated; glucose 333 - no reported history of diabetes.  General surgery consulted for evaluation.

## 2024-06-12 PROBLEM — E11.9 TYPE 2 DIABETES MELLITUS WITHOUT COMPLICATION, WITHOUT LONG-TERM CURRENT USE OF INSULIN: Status: ACTIVE | Noted: 2024-06-12

## 2024-06-12 LAB
ALBUMIN SERPL BCP-MCNC: 1.8 G/DL (ref 3.5–5.2)
ALP SERPL-CCNC: 111 U/L (ref 55–135)
ALT SERPL W/O P-5'-P-CCNC: 289 U/L (ref 10–44)
ANION GAP SERPL CALC-SCNC: 6 MMOL/L (ref 3–11)
AST SERPL-CCNC: 285 U/L (ref 10–40)
BILIRUB SERPL-MCNC: 0.9 MG/DL (ref 0.1–1)
BUN SERPL-MCNC: 12 MG/DL (ref 6–20)
CALCIUM SERPL-MCNC: 7.1 MG/DL (ref 8.7–10.5)
CHLORIDE SERPL-SCNC: 103 MMOL/L (ref 95–110)
CO2 SERPL-SCNC: 24 MMOL/L (ref 23–29)
CREAT SERPL-MCNC: 0.8 MG/DL (ref 0.5–1.4)
ERYTHROCYTE [DISTWIDTH] IN BLOOD BY AUTOMATED COUNT: 13.2 % (ref 11.5–14.5)
EST. GFR  (NO RACE VARIABLE): >60 ML/MIN/1.73 M^2
GLUCOSE SERPL-MCNC: 240 MG/DL (ref 70–110)
HCT VFR BLD AUTO: 35.5 % (ref 40–54)
HGB BLD-MCNC: 11.7 G/DL (ref 14–18)
MAGNESIUM SERPL-MCNC: 1.6 MG/DL (ref 1.6–2.6)
MCH RBC QN AUTO: 27.2 PG (ref 27–31)
MCHC RBC AUTO-ENTMCNC: 33 G/DL (ref 32–36)
MCV RBC AUTO: 83 FL (ref 82–98)
PHOSPHATE SERPL-MCNC: 2 MG/DL (ref 2.7–4.5)
PLATELET # BLD AUTO: 169 K/UL (ref 150–450)
PMV BLD AUTO: 10.8 FL (ref 9.2–12.9)
POCT GLUCOSE: 219 MG/DL (ref 70–110)
POCT GLUCOSE: 227 MG/DL (ref 70–110)
POCT GLUCOSE: 231 MG/DL (ref 70–110)
POCT GLUCOSE: 248 MG/DL (ref 70–110)
POCT GLUCOSE: 259 MG/DL (ref 70–110)
POTASSIUM SERPL-SCNC: 3.8 MMOL/L (ref 3.5–5.1)
PROT SERPL-MCNC: 5.4 G/DL (ref 6–8.4)
RBC # BLD AUTO: 4.3 M/UL (ref 4.6–6.2)
SODIUM SERPL-SCNC: 133 MMOL/L (ref 136–145)
WBC # BLD AUTO: 9.77 K/UL (ref 3.9–12.7)

## 2024-06-12 PROCEDURE — 99900031 HC PATIENT EDUCATION (STAT)

## 2024-06-12 PROCEDURE — 36415 COLL VENOUS BLD VENIPUNCTURE: CPT | Performed by: STUDENT IN AN ORGANIZED HEALTH CARE EDUCATION/TRAINING PROGRAM

## 2024-06-12 PROCEDURE — 25000003 PHARM REV CODE 250: Performed by: STUDENT IN AN ORGANIZED HEALTH CARE EDUCATION/TRAINING PROGRAM

## 2024-06-12 PROCEDURE — 94761 N-INVAS EAR/PLS OXIMETRY MLT: CPT

## 2024-06-12 PROCEDURE — 63600175 PHARM REV CODE 636 W HCPCS: Performed by: STUDENT IN AN ORGANIZED HEALTH CARE EDUCATION/TRAINING PROGRAM

## 2024-06-12 PROCEDURE — 80053 COMPREHEN METABOLIC PANEL: CPT | Performed by: STUDENT IN AN ORGANIZED HEALTH CARE EDUCATION/TRAINING PROGRAM

## 2024-06-12 PROCEDURE — 20000000 HC ICU ROOM

## 2024-06-12 PROCEDURE — 83735 ASSAY OF MAGNESIUM: CPT | Performed by: STUDENT IN AN ORGANIZED HEALTH CARE EDUCATION/TRAINING PROGRAM

## 2024-06-12 PROCEDURE — 99900035 HC TECH TIME PER 15 MIN (STAT)

## 2024-06-12 PROCEDURE — 84100 ASSAY OF PHOSPHORUS: CPT | Performed by: STUDENT IN AN ORGANIZED HEALTH CARE EDUCATION/TRAINING PROGRAM

## 2024-06-12 PROCEDURE — 27000221 HC OXYGEN, UP TO 24 HOURS

## 2024-06-12 PROCEDURE — 85027 COMPLETE CBC AUTOMATED: CPT | Performed by: STUDENT IN AN ORGANIZED HEALTH CARE EDUCATION/TRAINING PROGRAM

## 2024-06-12 RX ORDER — MORPHINE SULFATE 4 MG/ML
4 INJECTION, SOLUTION INTRAMUSCULAR; INTRAVENOUS EVERY 4 HOURS PRN
Status: DISCONTINUED | OUTPATIENT
Start: 2024-06-12 | End: 2024-06-15 | Stop reason: HOSPADM

## 2024-06-12 RX ORDER — INSULIN ASPART 100 [IU]/ML
0-10 INJECTION, SOLUTION INTRAVENOUS; SUBCUTANEOUS EVERY 6 HOURS PRN
Status: DISCONTINUED | OUTPATIENT
Start: 2024-06-12 | End: 2024-06-15 | Stop reason: HOSPADM

## 2024-06-12 RX ORDER — HYDROCODONE BITARTRATE AND ACETAMINOPHEN 10; 325 MG/1; MG/1
1 TABLET ORAL EVERY 4 HOURS PRN
Status: DISCONTINUED | OUTPATIENT
Start: 2024-06-12 | End: 2024-06-15 | Stop reason: HOSPADM

## 2024-06-12 RX ORDER — MUPIROCIN 20 MG/G
OINTMENT TOPICAL 2 TIMES DAILY
Status: DISCONTINUED | OUTPATIENT
Start: 2024-06-12 | End: 2024-06-15 | Stop reason: HOSPADM

## 2024-06-12 RX ORDER — GLUCAGON 1 MG
1 KIT INJECTION
Status: DISCONTINUED | OUTPATIENT
Start: 2024-06-12 | End: 2024-06-15 | Stop reason: HOSPADM

## 2024-06-12 RX ORDER — KETOROLAC TROMETHAMINE 30 MG/ML
15 INJECTION, SOLUTION INTRAMUSCULAR; INTRAVENOUS EVERY 6 HOURS
Status: COMPLETED | OUTPATIENT
Start: 2024-06-12 | End: 2024-06-15

## 2024-06-12 RX ADMIN — PIPERACILLIN SODIUM AND TAZOBACTAM SODIUM 4.5 G: 4; .5 INJECTION, POWDER, FOR SOLUTION INTRAVENOUS at 06:06

## 2024-06-12 RX ADMIN — METHOCARBAMOL 1000 MG: 500 TABLET ORAL at 08:06

## 2024-06-12 RX ADMIN — PIPERACILLIN SODIUM AND TAZOBACTAM SODIUM 4.5 G: 4; .5 INJECTION, POWDER, FOR SOLUTION INTRAVENOUS at 02:06

## 2024-06-12 RX ADMIN — MUPIROCIN: 20 OINTMENT TOPICAL at 08:06

## 2024-06-12 RX ADMIN — SODIUM CHLORIDE: 9 INJECTION, SOLUTION INTRAVENOUS at 10:06

## 2024-06-12 RX ADMIN — FAMOTIDINE 20 MG: 10 INJECTION, SOLUTION INTRAVENOUS at 08:06

## 2024-06-12 RX ADMIN — HYDROMORPHONE HYDROCHLORIDE 1 MG: 1 INJECTION, SOLUTION INTRAMUSCULAR; INTRAVENOUS; SUBCUTANEOUS at 03:06

## 2024-06-12 RX ADMIN — HYDROMORPHONE HYDROCHLORIDE 1 MG: 1 INJECTION, SOLUTION INTRAMUSCULAR; INTRAVENOUS; SUBCUTANEOUS at 09:06

## 2024-06-12 RX ADMIN — INSULIN ASPART 2 UNITS: 100 INJECTION, SOLUTION INTRAVENOUS; SUBCUTANEOUS at 05:06

## 2024-06-12 RX ADMIN — MORPHINE SULFATE 4 MG: 4 INJECTION, SOLUTION INTRAMUSCULAR; INTRAVENOUS at 03:06

## 2024-06-12 RX ADMIN — PROCHLORPERAZINE EDISYLATE 5 MG: 5 INJECTION INTRAMUSCULAR; INTRAVENOUS at 02:06

## 2024-06-12 RX ADMIN — METHOCARBAMOL 1000 MG: 500 TABLET ORAL at 05:06

## 2024-06-12 RX ADMIN — PIPERACILLIN SODIUM AND TAZOBACTAM SODIUM 4.5 G: 4; .5 INJECTION, POWDER, FOR SOLUTION INTRAVENOUS at 09:06

## 2024-06-12 RX ADMIN — KETOROLAC TROMETHAMINE 15 MG: 30 INJECTION, SOLUTION INTRAMUSCULAR; INTRAVENOUS at 05:06

## 2024-06-12 RX ADMIN — INSULIN ASPART 2 UNITS: 100 INJECTION, SOLUTION INTRAVENOUS; SUBCUTANEOUS at 11:06

## 2024-06-12 RX ADMIN — INSULIN ASPART 3 UNITS: 100 INJECTION, SOLUTION INTRAVENOUS; SUBCUTANEOUS at 12:06

## 2024-06-12 RX ADMIN — HYDROMORPHONE HYDROCHLORIDE 0.5 MG: 0.5 INJECTION, SOLUTION INTRAMUSCULAR; INTRAVENOUS; SUBCUTANEOUS at 12:06

## 2024-06-12 RX ADMIN — INSULIN ASPART 2 UNITS: 100 INJECTION, SOLUTION INTRAVENOUS; SUBCUTANEOUS at 12:06

## 2024-06-12 RX ADMIN — KETOROLAC TROMETHAMINE 15 MG: 30 INJECTION, SOLUTION INTRAMUSCULAR; INTRAVENOUS at 11:06

## 2024-06-12 RX ADMIN — METHOCARBAMOL 1000 MG: 500 TABLET ORAL at 12:06

## 2024-06-12 RX ADMIN — SODIUM CHLORIDE: 9 INJECTION, SOLUTION INTRAVENOUS at 05:06

## 2024-06-12 RX ADMIN — SODIUM CHLORIDE: 9 INJECTION, SOLUTION INTRAVENOUS at 03:06

## 2024-06-12 RX ADMIN — HYDROMORPHONE HYDROCHLORIDE 0.5 MG: 0.5 INJECTION, SOLUTION INTRAMUSCULAR; INTRAVENOUS; SUBCUTANEOUS at 05:06

## 2024-06-12 RX ADMIN — INSULIN ASPART 4 UNITS: 100 INJECTION, SOLUTION INTRAVENOUS; SUBCUTANEOUS at 06:06

## 2024-06-12 NOTE — PROGRESS NOTES
[facility] - Intensive Care  General Surgery  Progress Note    Subjective:     History of Present Illness:  39-year-old male who presents with 2 day history of severe right upper quadrant pain and nausea.  Has history of self-reported gastritis but thinks it may have been his gallbladder.  In emergency department, CT abdomen and pelvis demonstrated supra active cholecystitis with inflammation of the adjacent liver.  WBC within normal limits.  T bili 1.2.  ALP elevated; glucose 333 - no reported history of diabetes.  General surgery consulted for evaluation.    Post-Op Info:  Procedure(s) (LRB):  CHOLECYSTECTOMY, LAPAROSCOPIC (N/A)  CHOLECYSTECTOMY (N/A)   1 Day Post-Op     Interval History: Seen and examined.  MELQUIADESON.  Difficulty voiding after guzman removal this afternoon.  Pain moderately controlled with medication.      Medications:  Continuous Infusions:   sodium chloride 0.9%   Intravenous Continuous 150 mL/hr at 06/12/24 1014 New Bag at 06/12/24 1014     Scheduled Meds:   famotidine (PF)  20 mg Intravenous Q12H    methocarbamoL  1,000 mg Per NG tube QID    piperacillin-tazobactam (Zosyn) IV (PEDS and ADULTS) (extended infusion is not appropriate)  4.5 g Intravenous Q8H     PRN Meds:  Current Facility-Administered Medications:     dextrose 10%, 12.5 g, Intravenous, PRN    dextrose 10%, 25 g, Intravenous, PRN    dextrose 5 % (D5W), , Intravenous, PRN    glucagon (human recombinant), 1 mg, Intramuscular, PRN    HYDROmorphone, 0.5 mg, Intravenous, Q4H PRN    HYDROmorphone, 1 mg, Intravenous, Q4H PRN    insulin aspart U-100, 0-5 Units, Subcutaneous, Q6H PRN    melatonin, 6 mg, Oral, Nightly PRN    ondansetron, 4 mg, Intravenous, Q8H PRN    prochlorperazine, 5 mg, Intravenous, Q6H PRN    sodium chloride 0.9%, 10 mL, Intravenous, PRN     Review of patient's allergies indicates:  No Known Allergies  Objective:     Vital Signs (Most Recent):  Temp: 99.2 °F (37.3 °C) (06/12/24 1120)  Pulse: 82 (06/12/24 1400)  Resp: (!) 27  (06/12/24 1400)  BP: 129/74 (06/12/24 1400)  SpO2: 95 % (06/12/24 1400) Vital Signs (24h Range):  Temp:  [98 °F (36.7 °C)-99.2 °F (37.3 °C)] 99.2 °F (37.3 °C)  Pulse:  [71-83] 82  Resp:  [10-27] 27  SpO2:  [86 %-99 %] 95 %  BP: ()/(55-75) 129/74     Weight: 106.1 kg (234 lb)  Body mass index is 35.58 kg/m².    Intake/Output - Last 3 Shifts         06/10 0700 06/11 0659 06/11 0700 06/12 0659 06/12 0700  06/13 0659    I.V. (mL/kg)  8908.2 (84) 870 (8.2)    NG/GT  80     IV Piggyback  300 100    Total Intake(mL/kg)  9288.2 (87.5) 970 (9.1)    Urine (mL/kg/hr)  2025 (0.8) 725 (0.9)    Drains  365     Blood  1000     Total Output  3390 725    Net  +5898.2 +245                    Physical Exam  Vitals reviewed.   Constitutional:       General: He is not in acute distress.     Appearance: He is not ill-appearing or toxic-appearing.   Cardiovascular:      Rate and Rhythm: Normal rate and regular rhythm.   Pulmonary:      Effort: Pulmonary effort is normal. No respiratory distress.   Abdominal:      General: There is no distension.      Palpations: Abdomen is soft.      Tenderness: There is no abdominal tenderness. There is no guarding or rebound.      Comments: RUQ incision with surgical dressing in place   RUQ SANDRA drain with serosanguinous drainage with slight bile tinge   Suprapubic drain in place with serosang drainage in place    Musculoskeletal:      Right lower leg: No edema.      Left lower leg: No edema.   Skin:     General: Skin is warm and dry.      Capillary Refill: Capillary refill takes less than 2 seconds.   Neurological:      Mental Status: He is alert. Mental status is at baseline.          Significant Labs:  I have reviewed all pertinent lab results within the past 24 hours.  CBC:   Recent Labs   Lab 06/12/24  0657   WBC 9.77   RBC 4.30*   HGB 11.7*   HCT 35.5*      MCV 83   MCH 27.2   MCHC 33.0     CMP:   Recent Labs   Lab 06/12/24  0657   *   CALCIUM 7.1*   ALBUMIN 1.8*   PROT 5.4*    *   K 3.8   CO2 24      BUN 12   CREATININE 0.8   ALKPHOS 111   *   *   BILITOT 0.9       Significant Diagnostics:  I have reviewed all pertinent imaging results/findings within the past 24 hours.  Assessment/Plan:     * Cholecystitis with perforation of gallbladder  POD #1 s/p open partial cholecystectomy   -Small bile leak from liver bed controlled intra-op - concern for duct of Luschka leak   -Continue SANDRA drains  -Discontinue guzman and NGT  -Ice chips  -IVFs   -Multi modal pain control   -Encourage IS use   -Ambulate ad amara; out of bed to chair   -Continue IV Zosyn - intra-op cultures pending   -AM labs       Type 2 diabetes mellitus without complication, without long-term current use of insulin  Newly dx DMII  A1c 10;  glucose 333 on admission  IP consult to IM for management   SSI   POCT glucose        Kary Ackerman MD  General Surgery  [facility] - Intensive Care

## 2024-06-12 NOTE — SUBJECTIVE & OBJECTIVE
Interval History: Seen and examined.  DONALD.  Difficulty voiding after guzman removal this afternoon.  Pain moderately controlled with medication.      Medications:  Continuous Infusions:   sodium chloride 0.9%   Intravenous Continuous 150 mL/hr at 06/12/24 1014 New Bag at 06/12/24 1014     Scheduled Meds:   famotidine (PF)  20 mg Intravenous Q12H    methocarbamoL  1,000 mg Per NG tube QID    piperacillin-tazobactam (Zosyn) IV (PEDS and ADULTS) (extended infusion is not appropriate)  4.5 g Intravenous Q8H     PRN Meds:  Current Facility-Administered Medications:     dextrose 10%, 12.5 g, Intravenous, PRN    dextrose 10%, 25 g, Intravenous, PRN    dextrose 5 % (D5W), , Intravenous, PRN    glucagon (human recombinant), 1 mg, Intramuscular, PRN    HYDROmorphone, 0.5 mg, Intravenous, Q4H PRN    HYDROmorphone, 1 mg, Intravenous, Q4H PRN    insulin aspart U-100, 0-5 Units, Subcutaneous, Q6H PRN    melatonin, 6 mg, Oral, Nightly PRN    ondansetron, 4 mg, Intravenous, Q8H PRN    prochlorperazine, 5 mg, Intravenous, Q6H PRN    sodium chloride 0.9%, 10 mL, Intravenous, PRN     Review of patient's allergies indicates:  No Known Allergies  Objective:     Vital Signs (Most Recent):  Temp: 99.2 °F (37.3 °C) (06/12/24 1120)  Pulse: 82 (06/12/24 1400)  Resp: (!) 27 (06/12/24 1400)  BP: 129/74 (06/12/24 1400)  SpO2: 95 % (06/12/24 1400) Vital Signs (24h Range):  Temp:  [98 °F (36.7 °C)-99.2 °F (37.3 °C)] 99.2 °F (37.3 °C)  Pulse:  [71-83] 82  Resp:  [10-27] 27  SpO2:  [86 %-99 %] 95 %  BP: ()/(55-75) 129/74     Weight: 106.1 kg (234 lb)  Body mass index is 35.58 kg/m².    Intake/Output - Last 3 Shifts         06/10 0700 06/11 0659 06/11 0700 06/12 0659 06/12 0700 06/13 0659    I.V. (mL/kg)  8908.2 (84) 870 (8.2)    NG/GT  80     IV Piggyback  300 100    Total Intake(mL/kg)  9288.2 (87.5) 970 (9.1)    Urine (mL/kg/hr)  2025 (0.8) 725 (0.9)    Drains  365     Blood  1000     Total Output  3390 725    Net  +5898.2 +245                     Physical Exam  Vitals reviewed.   Constitutional:       General: He is not in acute distress.     Appearance: He is not ill-appearing or toxic-appearing.   Cardiovascular:      Rate and Rhythm: Normal rate and regular rhythm.   Pulmonary:      Effort: Pulmonary effort is normal. No respiratory distress.   Abdominal:      General: There is no distension.      Palpations: Abdomen is soft.      Tenderness: There is no abdominal tenderness. There is no guarding or rebound.      Comments: RUQ incision with surgical dressing in place   RUQ SANDRA drain with serosanguinous drainage with slight bile tinge   Suprapubic drain in place with serosang drainage in place    Musculoskeletal:      Right lower leg: No edema.      Left lower leg: No edema.   Skin:     General: Skin is warm and dry.      Capillary Refill: Capillary refill takes less than 2 seconds.   Neurological:      Mental Status: He is alert. Mental status is at baseline.          Significant Labs:  I have reviewed all pertinent lab results within the past 24 hours.  CBC:   Recent Labs   Lab 06/12/24  0657   WBC 9.77   RBC 4.30*   HGB 11.7*   HCT 35.5*      MCV 83   MCH 27.2   MCHC 33.0     CMP:   Recent Labs   Lab 06/12/24  0657   *   CALCIUM 7.1*   ALBUMIN 1.8*   PROT 5.4*   *   K 3.8   CO2 24      BUN 12   CREATININE 0.8   ALKPHOS 111   *   *   BILITOT 0.9       Significant Diagnostics:  I have reviewed all pertinent imaging results/findings within the past 24 hours.

## 2024-06-12 NOTE — ASSESSMENT & PLAN NOTE
POD #1 s/p open partial cholecystectomy   -Small bile leak from liver bed controlled intra-op - concern for duct of Luschka leak   -Continue SANDRA drains  -Ice chips  -IVFs   -Multi modal pain control   -Encourage IS use   -Ambulate ad amara; out of bed to chair   -Continue IV Zosyn - intra-op cultures pending   -AM labs

## 2024-06-12 NOTE — BRIEF OP NOTE
[facility] - OR Periop Services  General Surgery Department  Brief Op Note    SUMMARY     Date of Procedure: 6/11/2024    Procedure: Procedure(s) (LRB):   Exploratory laparoscopy  Open cholecystectomy     Surgeon(s) and Role:  Kary Ackerman MD    Pre-Operative Diagnosis: Pre-Op Diagnosis Codes:     * Cholecystitis [K81.9]    Post-Operative Diagnosis:  Acute calculous cholecystitis with perforation and gangrene     Anesthesia: General    Findings:   Laparoscopic irrigation and drainage of pelvic fluid with drain placement - 200cc purulence evacuated   Converted to open - Gallbladder necrotic with large perforation at the fundus   Multiple large gallstones   Partial cholecystectomy   Slow bile leak from single location on liver bed - 15Fr round drain left in place     Complications: No    Estimated Blood Loss (EBL): 700cc           Implants: * No implants in log *    Specimens:   Culture - peritoneal fluid           Condition: Stable    Disposition: ICU - extubated and stable.        Kary Ackerman MD  General Surgery   [phone]

## 2024-06-12 NOTE — PLAN OF CARE
Patient vitals stable. Pain medication helping patient's pain level. Remains on NS @ 150ml/hr and Zosyn antibiotic. Patterson and NG tube d/c today. Pt ambulated with assistance to bathroom, which was painful for him to do.

## 2024-06-12 NOTE — PLAN OF CARE
Problem: Adult Inpatient Plan of Care  Goal: Plan of Care Review  Outcome: Progressing  Goal: Patient-Specific Goal (Individualized)  Outcome: Progressing  Goal: Absence of Hospital-Acquired Illness or Injury  Outcome: Progressing  Goal: Optimal Comfort and Wellbeing  Outcome: Progressing  Goal: Readiness for Transition of Care  Outcome: Progressing     Problem: Pain Acute  Goal: Optimal Pain Control and Function  Outcome: Progressing     Problem: Adult Inpatient Plan of Care  Goal: Plan of Care Review  Outcome: Progressing  Goal: Patient-Specific Goal (Individualized)  Outcome: Progressing  Goal: Absence of Hospital-Acquired Illness or Injury  Outcome: Progressing  Goal: Optimal Comfort and Wellbeing  Outcome: Progressing  Goal: Readiness for Transition of Care  Outcome: Progressing     Problem: Pain Acute  Goal: Optimal Pain Control and Function  Outcome: Progressing     Problem: Cholecystectomy  Goal: Absence of Bleeding  Outcome: Progressing  Goal: Effective Bowel Elimination  Outcome: Progressing  Goal: Fluid and Electrolyte Balance  Outcome: Progressing  Goal: Absence of Infection Signs and Symptoms  Outcome: Progressing  Goal: Anesthesia/Sedation Recovery  Outcome: Progressing  Goal: Pain Control and Function  Outcome: Progressing  Goal: Nausea and Vomiting Relief  Outcome: Progressing  Goal: Effective Urinary Elimination  Outcome: Progressing  Goal: Effective Oxygenation and Ventilation  Outcome: Progressing     Problem: Infection  Goal: Absence of Infection Signs and Symptoms  Outcome: Progressing     Problem: Wound  Goal: Optimal Coping  Outcome: Progressing  Goal: Optimal Functional Ability  Outcome: Progressing  Goal: Absence of Infection Signs and Symptoms  Outcome: Progressing  Goal: Improved Oral Intake  Outcome: Progressing  Goal: Optimal Pain Control and Function  Outcome: Progressing  Goal: Skin Health and Integrity  Outcome: Progressing  Goal: Optimal Wound Healing  Outcome: Progressing      Problem: Skin Injury Risk Increased  Goal: Skin Health and Integrity  Outcome: Progressing     Problem: Fall Injury Risk  Goal: Absence of Fall and Fall-Related Injury  Outcome: Progressing     No complaints of nausea, pain has been level 10, relieved with medication. Low grade temp. VSS, urine output is good.

## 2024-06-12 NOTE — EICU
Intervention Initiated From:  [facility] / EICU    Irene intervened regarding:  Rounding (Video assessment)    Nurse Notified:  Yes    Doctor Notified:  Yes    Comments: Resting quietly  INF infusing at 150ml/hr  HR 84, B/P 97/61, RR17, Sats 98%    MARILU Mcgregor M.D. notified of new admit

## 2024-06-12 NOTE — NURSING
Pt attempted to use urinal to void while laying in bed, pt unable to void so bladder scan performed with results of 539mls present. Pt ambulated to bathroom with assistance per me and was able to void 400mls of urine into urinal post guzman removal.

## 2024-06-12 NOTE — ANESTHESIA POSTPROCEDURE EVALUATION
Anesthesia Post Evaluation    Patient: Justino Fortune    Procedure(s) Performed: Procedure(s) (LRB):  CHOLECYSTECTOMY, LAPAROSCOPIC (N/A)  CHOLECYSTECTOMY (N/A)    Final Anesthesia Type: general      Patient location during evaluation: ICU  Patient participation: Yes- Able to Participate  Level of consciousness: awake  Post-procedure vital signs: reviewed and stable  Pain management: adequate  Airway patency: patent    PONV status at discharge: No PONV  Anesthetic complications: no      Cardiovascular status: blood pressure returned to baseline  Respiratory status: spontaneous ventilation  Hydration status: euvolemic  Follow-up not needed.              Vitals Value Taken Time   /71 06/12/24 0701   Temp 36.7 °C (98 °F) 06/12/24 0701   Pulse 74 06/12/24 0701   Resp 18 06/12/24 0701   SpO2 97 % 06/12/24 0701         No case tracking events are documented in the log.      Pain/Jennifer Score: Pain Rating Prior to Med Admin: 6 (6/12/2024  5:55 AM)  Pain Rating Post Med Admin: 0 (6/12/2024  5:55 AM)

## 2024-06-12 NOTE — NURSING
2115 Patient arrived from OR per bed, connected patient to the monitor, VSS. Report received, Viewed the surgical incision right lateral and J/P drains and lap incisions. All dressings are clean dry and intact. NGT in the right nare connected to low intermittent suction. Patterson noted to gravity pranav urine noted.IV in the left AC to N/S at 150 cc/hr per IV pump as ordered.    1950 Reported to  unable to flush the NGT and no drainage noted, she ordered a X-Ray.    2025 X-ray performed NGT is in place, pulled back 6 cm then able to flush easily, retaped to the nose,meds given.

## 2024-06-12 NOTE — ASSESSMENT & PLAN NOTE
Newly dx DMII  A1c 10;  glucose 333 on admission  IP consult to IM for management   SSI   POCT glucose

## 2024-06-12 NOTE — EICU
New Patient Evaluation    HPI:  39 M presented with 2 day history of RUQ pain accompanied by nausea. CT abdomen reveals cholecystitis. Underwent laparoscopic cholecystectomy that was converted to open. Galbladder noted to be necrotic with large perforation of the fundus with slow bile leak from single location on liver bed, drain left in place.  cc. Admitted to ICU extubated and stable.    Camera Assessment:  /94  HR 79  O2 98%  Seen asleep not in distress    Data:  WBC 10.65, H/H 12.6/37.1, platelets 177  Na 135, K 5.4, CO2 20, creatinine 1  ,   Lactic acid 1.4  HbA1C 10.9    Assessment and Plans:  Acute cholecystitis s/p cholecystectomy, on piperacillin-tazobactam  Newly diagnosed DM placed on sliding scale insulin, ongoing fluids. Will likely need to be started on basal insulin

## 2024-06-12 NOTE — ASSESSMENT & PLAN NOTE
POD #1 s/p open partial cholecystectomy   -Small bile leak from liver bed controlled intra-op - concern for duct of Luschka leak   -Continue SANDRA drains  -Discontinue guzman and NGT  -Ice chips  -IVFs   -Multi modal pain control   -Encourage IS use   -Ambulate ad amara; out of bed to chair   -Continue IV Zosyn - intra-op cultures pending   -AM labs     06/13 CP: S/p open cholecystectomy POD#2  -Continue SANDRA drains-serosanguineous drainage noted   -Advance to CLD  -IVFs   -Multi modal pain control   -Encourage IS use   -Ambulate ad amara; out of bed to chair   -Continue IV Zosyn - intra-op cultures pending   -AM labs   -Tbili WNL; LFTs trending down   -Transfer to med surg unit

## 2024-06-12 NOTE — TRANSFER OF CARE
Anesthesia Transfer of Care Note    Patient: Justino Fortune    Procedure(s) Performed: Procedure(s) (LRB):  CHOLECYSTECTOMY, LAPAROSCOPIC (N/A)  CHOLECYSTECTOMY (N/A)    Patient location: ICU    Anesthesia Type: general    Transport from OR: Transported from OR on 6-10 L/min O2 by face mask with adequate spontaneous ventilation    Post pain: adequate analgesia    Post assessment: no apparent anesthetic complications    Post vital signs: stable    Level of consciousness: awake    Nausea/Vomiting: no nausea/vomiting    Complications: none    Transfer of care protocol was followed      Last vitals:   105/75  16 RR  98% O2 SAT  85 HR  37 C TEMP

## 2024-06-13 PROBLEM — E87.6 HYPOKALEMIA: Status: ACTIVE | Noted: 2024-06-13

## 2024-06-13 PROBLEM — E83.39 HYPOPHOSPHATEMIA: Status: ACTIVE | Noted: 2024-06-13

## 2024-06-13 PROBLEM — R79.89 ELEVATED LFTS: Status: ACTIVE | Noted: 2024-06-13

## 2024-06-13 LAB
ALBUMIN SERPL BCP-MCNC: 1.7 G/DL (ref 3.5–5.2)
ALP SERPL-CCNC: 108 U/L (ref 55–135)
ALT SERPL W/O P-5'-P-CCNC: 189 U/L (ref 10–44)
ANION GAP SERPL CALC-SCNC: 7 MMOL/L (ref 3–11)
AST SERPL-CCNC: 98 U/L (ref 10–40)
BILIRUB SERPL-MCNC: 0.6 MG/DL (ref 0.1–1)
BUN SERPL-MCNC: 12 MG/DL (ref 6–20)
CALCIUM SERPL-MCNC: 7.5 MG/DL (ref 8.7–10.5)
CHLORIDE SERPL-SCNC: 102 MMOL/L (ref 95–110)
CO2 SERPL-SCNC: 25 MMOL/L (ref 23–29)
CREAT SERPL-MCNC: 0.7 MG/DL (ref 0.5–1.4)
ERYTHROCYTE [DISTWIDTH] IN BLOOD BY AUTOMATED COUNT: 13.3 % (ref 11.5–14.5)
EST. GFR  (NO RACE VARIABLE): >60 ML/MIN/1.73 M^2
GLUCOSE SERPL-MCNC: 219 MG/DL (ref 70–110)
HCT VFR BLD AUTO: 30.3 % (ref 40–54)
HGB BLD-MCNC: 10.3 G/DL (ref 14–18)
MAGNESIUM SERPL-MCNC: 1.7 MG/DL (ref 1.6–2.6)
MCH RBC QN AUTO: 27.7 PG (ref 27–31)
MCHC RBC AUTO-ENTMCNC: 34 G/DL (ref 32–36)
MCV RBC AUTO: 82 FL (ref 82–98)
PHOSPHATE SERPL-MCNC: 1.6 MG/DL (ref 2.7–4.5)
PLATELET # BLD AUTO: 189 K/UL (ref 150–450)
PMV BLD AUTO: 10.9 FL (ref 9.2–12.9)
POCT GLUCOSE: 213 MG/DL (ref 70–110)
POCT GLUCOSE: 220 MG/DL (ref 70–110)
POTASSIUM SERPL-SCNC: 3.4 MMOL/L (ref 3.5–5.1)
PROT SERPL-MCNC: 5.5 G/DL (ref 6–8.4)
RBC # BLD AUTO: 3.72 M/UL (ref 4.6–6.2)
SODIUM SERPL-SCNC: 134 MMOL/L (ref 136–145)
WBC # BLD AUTO: 9.85 K/UL (ref 3.9–12.7)

## 2024-06-13 PROCEDURE — 63600175 PHARM REV CODE 636 W HCPCS: Performed by: STUDENT IN AN ORGANIZED HEALTH CARE EDUCATION/TRAINING PROGRAM

## 2024-06-13 PROCEDURE — 25000003 PHARM REV CODE 250: Performed by: STUDENT IN AN ORGANIZED HEALTH CARE EDUCATION/TRAINING PROGRAM

## 2024-06-13 PROCEDURE — 63600175 PHARM REV CODE 636 W HCPCS: Performed by: INTERNAL MEDICINE

## 2024-06-13 PROCEDURE — 85027 COMPLETE CBC AUTOMATED: CPT | Performed by: STUDENT IN AN ORGANIZED HEALTH CARE EDUCATION/TRAINING PROGRAM

## 2024-06-13 PROCEDURE — 94799 UNLISTED PULMONARY SVC/PX: CPT

## 2024-06-13 PROCEDURE — 21400001 HC TELEMETRY ROOM

## 2024-06-13 PROCEDURE — 94761 N-INVAS EAR/PLS OXIMETRY MLT: CPT

## 2024-06-13 PROCEDURE — 36415 COLL VENOUS BLD VENIPUNCTURE: CPT | Performed by: STUDENT IN AN ORGANIZED HEALTH CARE EDUCATION/TRAINING PROGRAM

## 2024-06-13 PROCEDURE — 99900031 HC PATIENT EDUCATION (STAT)

## 2024-06-13 PROCEDURE — 99900035 HC TECH TIME PER 15 MIN (STAT)

## 2024-06-13 PROCEDURE — 25000003 PHARM REV CODE 250: Performed by: INTERNAL MEDICINE

## 2024-06-13 PROCEDURE — 25000003 PHARM REV CODE 250

## 2024-06-13 PROCEDURE — 99024 POSTOP FOLLOW-UP VISIT: CPT | Mod: ,,,

## 2024-06-13 PROCEDURE — 27000221 HC OXYGEN, UP TO 24 HOURS

## 2024-06-13 PROCEDURE — 83735 ASSAY OF MAGNESIUM: CPT | Performed by: STUDENT IN AN ORGANIZED HEALTH CARE EDUCATION/TRAINING PROGRAM

## 2024-06-13 PROCEDURE — 84100 ASSAY OF PHOSPHORUS: CPT | Performed by: STUDENT IN AN ORGANIZED HEALTH CARE EDUCATION/TRAINING PROGRAM

## 2024-06-13 PROCEDURE — 80053 COMPREHEN METABOLIC PANEL: CPT | Performed by: STUDENT IN AN ORGANIZED HEALTH CARE EDUCATION/TRAINING PROGRAM

## 2024-06-13 RX ORDER — POTASSIUM CHLORIDE 7.45 MG/ML
10 INJECTION INTRAVENOUS
Status: DISPENSED | OUTPATIENT
Start: 2024-06-13 | End: 2024-06-13

## 2024-06-13 RX ORDER — ENOXAPARIN SODIUM 100 MG/ML
40 INJECTION SUBCUTANEOUS EVERY 24 HOURS
Status: DISCONTINUED | OUTPATIENT
Start: 2024-06-13 | End: 2024-06-15 | Stop reason: HOSPADM

## 2024-06-13 RX ORDER — SODIUM,POTASSIUM PHOSPHATES 280-250MG
2 POWDER IN PACKET (EA) ORAL
Status: COMPLETED | OUTPATIENT
Start: 2024-06-13 | End: 2024-06-14

## 2024-06-13 RX ORDER — INSULIN GLARGINE 100 [IU]/ML
8 INJECTION, SOLUTION SUBCUTANEOUS DAILY
Status: DISCONTINUED | OUTPATIENT
Start: 2024-06-13 | End: 2024-06-13

## 2024-06-13 RX ORDER — SODIUM,POTASSIUM PHOSPHATES 280-250MG
2 POWDER IN PACKET (EA) ORAL
Status: DISCONTINUED | OUTPATIENT
Start: 2024-06-13 | End: 2024-06-13

## 2024-06-13 RX ADMIN — SODIUM CHLORIDE: 9 INJECTION, SOLUTION INTRAVENOUS at 07:06

## 2024-06-13 RX ADMIN — FAMOTIDINE 20 MG: 10 INJECTION, SOLUTION INTRAVENOUS at 08:06

## 2024-06-13 RX ADMIN — POTASSIUM & SODIUM PHOSPHATES POWDER PACK 280-160-250 MG 2 PACKET: 280-160-250 PACK at 04:06

## 2024-06-13 RX ADMIN — METHOCARBAMOL 1000 MG: 500 TABLET ORAL at 12:06

## 2024-06-13 RX ADMIN — METHOCARBAMOL 1000 MG: 500 TABLET ORAL at 08:06

## 2024-06-13 RX ADMIN — KETOROLAC TROMETHAMINE 15 MG: 30 INJECTION, SOLUTION INTRAMUSCULAR; INTRAVENOUS at 12:06

## 2024-06-13 RX ADMIN — POTASSIUM CHLORIDE 10 MEQ: 7.46 INJECTION, SOLUTION INTRAVENOUS at 01:06

## 2024-06-13 RX ADMIN — INSULIN ASPART 4 UNITS: 100 INJECTION, SOLUTION INTRAVENOUS; SUBCUTANEOUS at 08:06

## 2024-06-13 RX ADMIN — PIPERACILLIN SODIUM AND TAZOBACTAM SODIUM 4.5 G: 4; .5 INJECTION, POWDER, FOR SOLUTION INTRAVENOUS at 06:06

## 2024-06-13 RX ADMIN — KETOROLAC TROMETHAMINE 15 MG: 30 INJECTION, SOLUTION INTRAMUSCULAR; INTRAVENOUS at 05:06

## 2024-06-13 RX ADMIN — POTASSIUM CHLORIDE 10 MEQ: 7.46 INJECTION, SOLUTION INTRAVENOUS at 09:06

## 2024-06-13 RX ADMIN — HYDROCODONE BITARTRATE AND ACETAMINOPHEN 1 TABLET: 10; 325 TABLET ORAL at 09:06

## 2024-06-13 RX ADMIN — MUPIROCIN: 20 OINTMENT TOPICAL at 08:06

## 2024-06-13 RX ADMIN — METHOCARBAMOL 1000 MG: 500 TABLET ORAL at 09:06

## 2024-06-13 RX ADMIN — FAMOTIDINE 20 MG: 10 INJECTION, SOLUTION INTRAVENOUS at 09:06

## 2024-06-13 RX ADMIN — ENOXAPARIN SODIUM 40 MG: 40 INJECTION SUBCUTANEOUS at 04:06

## 2024-06-13 RX ADMIN — INSULIN DETEMIR 8 UNITS: 100 INJECTION, SOLUTION SUBCUTANEOUS at 08:06

## 2024-06-13 RX ADMIN — SODIUM CHLORIDE: 9 INJECTION, SOLUTION INTRAVENOUS at 12:06

## 2024-06-13 RX ADMIN — INSULIN ASPART 2 UNITS: 100 INJECTION, SOLUTION INTRAVENOUS; SUBCUTANEOUS at 06:06

## 2024-06-13 RX ADMIN — POTASSIUM & SODIUM PHOSPHATES POWDER PACK 280-160-250 MG 2 PACKET: 280-160-250 PACK at 12:06

## 2024-06-13 RX ADMIN — POTASSIUM CHLORIDE 10 MEQ: 7.46 INJECTION, SOLUTION INTRAVENOUS at 11:06

## 2024-06-13 RX ADMIN — METHOCARBAMOL 1000 MG: 500 TABLET ORAL at 04:06

## 2024-06-13 RX ADMIN — POTASSIUM & SODIUM PHOSPHATES POWDER PACK 280-160-250 MG 2 PACKET: 280-160-250 PACK at 09:06

## 2024-06-13 NOTE — PLAN OF CARE
[facility] Surg  Discharge Reassessment    Primary Care Provider: No, Primary Doctor    Expected Discharge Date:     Reassessment (most recent)       Discharge Reassessment - 06/13/24 1207          Discharge Reassessment    Assessment Type Discharge Planning Assessment     Did the patient's condition or plan change since previous assessment? Yes     Discharge Plan discussed with: Patient     Communicated ROMEO with patient/caregiver No     Discharge Plan A Home     Discharge Plan B Home     DME Needed Upon Discharge  other (see comments)   TBD    Transition of Care Barriers None     Why the patient remains in the hospital Requires continued medical care        Post-Acute Status    Discharge Delays None known at this time                 Spoke to the patient in his room. A professional  was desi Chanel #[id number]. The patient plans to return back home with his friends. At this time, the patient does not require any assistance from case management. SW/CM will remain available if needed. Contact information was left in the patient's room.

## 2024-06-13 NOTE — EICU
Intervention Initiated From:  [facility] / SOLEDAD Bonilla intervened regarding:  Rounding (Video assessment)    Comments: Resting in bed.  RN at the [facility].  Video rounding complete.

## 2024-06-13 NOTE — HPI
39-year-old male who presents with 2 day history of severe right upper quadrant pain and nausea. Has history of self-reported gastritis but thinks it may have been his gallbladder. In emergency department, CT abdomen and pelvis demonstrated supra active cholecystitis with inflammation of the adjacent liver. WBC within normal limits. T bili 1.2. ALP elevated; glucose 333 - no reported history of diabetes. General surgery consulted for evaluation   PT started on iv abxs, npo status.  Pt had surgery on 6/11/24 with an open cholecystectomy - found to have puralence and perforated gallbladder with gallstones.  This am, pt is feelign ok - some mild abd pain.    Explained to pt that he is a newly diagnosed diabetic (A1c is 10.9) - dicussed some changes he will need to in his diet when able to eat - will start low dose insulin with ssi for now to lower bs

## 2024-06-13 NOTE — SUBJECTIVE & OBJECTIVE
Past Medical History:   Diagnosis Date    Gastritis        Past Surgical History:   Procedure Laterality Date    CHOLECYSTECTOMY N/A 6/11/2024    Procedure: CHOLECYSTECTOMY;  Surgeon: Kary Ackerman MD;  Location: [facility] OR;  Service: General;  Laterality: N/A;  Partial    LAPAROSCOPIC CHOLECYSTECTOMY N/A 6/11/2024    Procedure: CHOLECYSTECTOMY, LAPAROSCOPIC;  Surgeon: Kary Ackerman MD;  Location: [facility] OR;  Service: General;  Laterality: N/A;  CONVERTED TO OPEN       Review of patient's allergies indicates:  No Known Allergies    No current facility-administered medications on file prior to encounter.     No current outpatient medications on file prior to encounter.     Family History    None       Tobacco Use    Smoking status: Not on file    Smokeless tobacco: Not on file   Substance and Sexual Activity    Alcohol use: Not on file    Drug use: Not on file    Sexual activity: Not on file     Review of Systems   Constitutional:  Negative for activity change, fatigue and fever.   HENT:  Negative for mouth sores, sinus pressure and sore throat.    Respiratory:  Negative for cough, shortness of breath and wheezing.    Cardiovascular:  Negative for chest pain, palpitations and leg swelling.   Gastrointestinal:  Positive for abdominal distention and abdominal pain. Negative for diarrhea and vomiting.   Musculoskeletal:  Negative for back pain and neck pain.   Skin:  Negative for rash and wound.   Neurological:  Negative for weakness.     Objective:     Vital Signs (Most Recent):  Temp: 99.2 °F (37.3 °C) (06/13/24 0015)  Pulse: 67 (06/13/24 0735)  Resp: 18 (06/13/24 0735)  BP: 128/77 (06/13/24 0600)  SpO2: 97 % (06/13/24 0735) Vital Signs (24h Range):  Temp:  [98.7 °F (37.1 °C)-99.7 °F (37.6 °C)] 99.2 °F (37.3 °C)  Pulse:  [65-89] 67  Resp:  [10-33] 18  SpO2:  [86 %-98 %] 97 %  BP: (114-141)/(63-83) 128/77     Weight: 106.1 kg (234 lb)  Body mass index is 35.58 kg/m².     Physical Exam  Constitutional:        Appearance: He is ill-appearing.   HENT:      Nose: Nose normal.      Mouth/Throat:      Pharynx: Oropharynx is clear.   Eyes:      Extraocular Movements: Extraocular movements intact.      Pupils: Pupils are equal, round, and reactive to light.   Cardiovascular:      Rate and Rhythm: Normal rate and regular rhythm.   Pulmonary:      Effort: Pulmonary effort is normal.      Breath sounds: Normal breath sounds.   Abdominal:      Palpations: Abdomen is soft.      Tenderness: There is abdominal tenderness.   Musculoskeletal:         General: Normal range of motion.      Cervical back: Normal range of motion.   Skin:     General: Skin is warm and dry.      Capillary Refill: Capillary refill takes less than 2 seconds.   Neurological:      General: No focal deficit present.      Mental Status: He is alert.          Significant Labs: All pertinent labs within the past 24 hours have been reviewed.  A1C:   Recent Labs   Lab 06/11/24  0511   HGBA1C 10.9*     CBC:   Recent Labs   Lab 06/11/24 2016 06/12/24  0657 06/13/24  0711   WBC 10.65 9.77 9.85   HGB 12.6* 11.7* 10.3*   HCT 37.1* 35.5* 30.3*    169 189     CMP:   Recent Labs   Lab 06/11/24 2016 06/12/24  0657 06/13/24  0711   * 133* 134*   K 5.4* 3.8 3.4*    103 102   CO2 20* 24 25   * 240* 219*   BUN 16 12 12   CREATININE 1.0 0.8 0.7   CALCIUM 6.6* 7.1* 7.5*   PROT 5.7* 5.4* 5.5*   ALBUMIN 2.0* 1.8* 1.7*   BILITOT 1.3* 0.9 0.6   ALKPHOS 121 111 108   * 285* 98*   * 289* 189*   ANIONGAP 5 6 7       Significant Imaging: I have reviewed all pertinent imaging results/findings within the past 24 hours.

## 2024-06-13 NOTE — PLAN OF CARE
Problem: Adult Inpatient Plan of Care  Goal: Plan of Care Review  Outcome: Progressing     Problem: Fall Injury Risk  Goal: Absence of Fall and Fall-Related Injury  Outcome: Progressing     Problem: Diabetes Comorbidity  Goal: Blood Glucose Level Within Targeted Range  Outcome: Progressing     Problem: Cholecystectomy  Goal: Pain Control and Function  Outcome: Progressing

## 2024-06-13 NOTE — ASSESSMENT & PLAN NOTE
Lab Results   Component Value Date     (H) 06/13/2024    AST 98 (H) 06/13/2024    ALKPHOS 108 06/13/2024    BILITOT 0.6 06/13/2024   Daily CMP.  Tbili WNL. LFTs trending down.

## 2024-06-13 NOTE — PROGRESS NOTES
[facility]  General Surgery  Progress Note    Subjective:     History of Present Illness:  39-year-old male who presents with 2 day history of severe right upper quadrant pain and nausea.  Has history of self-reported gastritis but thinks it may have been his gallbladder.  In emergency department, CT abdomen and pelvis demonstrated supra active cholecystitis with inflammation of the adjacent liver.  WBC within normal limits.  T bili 1.2.  ALP elevated; glucose 333 - no reported history of diabetes.  General surgery consulted for evaluation.    Hospital Course:  06/13 CP: S/p open cholecystectomy POD#2. Abdominal pain moderately controlled. Bilateral SANDRA drains with serosanguinous drainage. Tbili 0.6. LFTs trending down. Electrolyte abnormalities noted. Stable for transfer to med surg unit.     Post-Op Info:  Procedure(s) (LRB):  CHOLECYSTECTOMY, LAPAROSCOPIC (N/A)  CHOLECYSTECTOMY (N/A)   2 Days Post-Op     Interval History: Seen and examined.  STEPHANYEON.  Difficulty voiding after guzman removal this afternoon.  Pain moderately controlled with medication.      Medications:  Continuous Infusions:   sodium chloride 0.9%   Intravenous Continuous 150 mL/hr at 06/13/24 0752 New Bag at 06/13/24 0752     Scheduled Meds:   enoxparin  40 mg Subcutaneous Q24H (prophylaxis, 1700)    famotidine (PF)  20 mg Intravenous Q12H    insulin detemir U-100  8 Units Subcutaneous Daily    ketorolac  15 mg Intravenous Q6H    methocarbamoL  1,000 mg Per NG tube QID    mupirocin   Nasal BID    potassium chloride  10 mEq Intravenous Q1H    potassium, sodium phosphates  2 packet Oral QID (AC & HS)     PRN Meds:  Current Facility-Administered Medications:     dextrose 10%, 12.5 g, Intravenous, PRN    dextrose 10%, 25 g, Intravenous, PRN    dextrose 5 % (D5W), , Intravenous, PRN    glucagon (human recombinant), 1 mg, Intramuscular, PRN    HYDROcodone-acetaminophen, 1 tablet, Oral, Q4H PRN    insulin aspart U-100, 0-10 Units, Subcutaneous, Q6H  PRN    melatonin, 6 mg, Oral, Nightly PRN    morphine, 4 mg, Intravenous, Q4H PRN    ondansetron, 4 mg, Intravenous, Q8H PRN    prochlorperazine, 5 mg, Intravenous, Q6H PRN    sodium chloride 0.9%, 10 mL, Intravenous, PRN     Review of patient's allergies indicates:  No Known Allergies  Objective:     Vital Signs (Most Recent):  Temp: 98.6 °F (37 °C) (06/13/24 1045)  Pulse: 71 (06/13/24 1105)  Resp: 18 (06/13/24 1105)  BP: (!) 144/82 (06/13/24 1045)  SpO2: 96 % (06/13/24 1105) Vital Signs (24h Range):  Temp:  [98.5 °F (36.9 °C)-99.7 °F (37.6 °C)] 98.6 °F (37 °C)  Pulse:  [65-89] 71  Resp:  [10-33] 18  SpO2:  [91 %-98 %] 96 %  BP: (116-147)/(63-83) 144/82     Weight: 106.1 kg (234 lb)  Body mass index is 35.58 kg/m².    Intake/Output - Last 3 Shifts         06/11 0700  06/12 0659 06/12 0700  06/13 0659 06/13 0700  06/14 0659    P.O.  160     I.V. (mL/kg) 8908.2 (84) 3559 (33.5) 267.5 (2.5)    NG/GT 80      IV Piggyback 300 299.2 108.3    Total Intake(mL/kg) 9288.2 (87.5) 4018.1 (37.9) 375.8 (3.5)    Urine (mL/kg/hr) 2025 (0.8) 1775 (0.7) 550 (0.9)    Drains 365 355 60    Blood 1000      Total Output 3390 2130 610    Net +5898.2 +1888.1 -234.2           Urine Occurrence  1 x              Physical Exam  Vitals reviewed.   Constitutional:       General: He is not in acute distress.     Appearance: He is not ill-appearing or toxic-appearing.   HENT:      Head: Normocephalic and atraumatic.      Mouth/Throat:      Mouth: Mucous membranes are moist.   Eyes:      Conjunctiva/sclera: Conjunctivae normal.   Cardiovascular:      Rate and Rhythm: Normal rate and regular rhythm.   Pulmonary:      Effort: Pulmonary effort is normal. No respiratory distress.   Abdominal:      General: There is no distension.      Palpations: Abdomen is soft.      Tenderness: There is abdominal tenderness. There is no guarding or rebound.      Comments: RUQ incision with surgical dressing in place   RUQ SNADRA drain with serosanguinous drainage with  slight bile tinge   Suprapubic drain in place with serosang drainage in place    Musculoskeletal:      Cervical back: Normal range of motion and neck supple.      Right lower leg: No edema.      Left lower leg: No edema.   Skin:     General: Skin is warm and dry.      Capillary Refill: Capillary refill takes less than 2 seconds.   Neurological:      Mental Status: He is alert. Mental status is at baseline.          Significant Labs:  I have reviewed all pertinent lab results within the past 24 hours.  CBC:   Recent Labs   Lab 06/13/24  0711   WBC 9.85   RBC 3.72*   HGB 10.3*   HCT 30.3*      MCV 82   MCH 27.7   MCHC 34.0     CMP:   Recent Labs   Lab 06/13/24  0711   *   CALCIUM 7.5*   ALBUMIN 1.7*   PROT 5.5*   *   K 3.4*   CO2 25      BUN 12   CREATININE 0.7   ALKPHOS 108   *   AST 98*   BILITOT 0.6       Significant Diagnostics:  I have reviewed all pertinent imaging results/findings within the past 24 hours.  Assessment/Plan:     * Cholecystitis with perforation of gallbladder  POD #1 s/p open partial cholecystectomy   -Small bile leak from liver bed controlled intra-op - concern for duct of Luschka leak   -Continue SANDRA drains  -Discontinue guzman and NGT  -Ice chips  -IVFs   -Multi modal pain control   -Encourage IS use   -Ambulate ad amara; out of bed to chair   -Continue IV Zosyn - intra-op cultures pending   -AM labs     06/13 CP: S/p open cholecystectomy POD#2  -Continue SANDRA drains-serosanguineous drainage noted   -Advance to CLD  -IVFs   -Multi modal pain control   -Encourage IS use   -Ambulate ad amara; out of bed to chair   -Continue IV Zosyn - intra-op cultures pending   -AM labs   -Tbili WNL; LFTs trending down   -Transfer to med surg unit    Hypophosphatemia  Patient has Abnormal Phosphorus: hypophosphatemia. Will continue to monitor electrolytes closely. Will replace the affected electrolytes and repeat labs to be done after interventions completed. The patient's phosphorus  results have been reviewed and are listed below.  Recent Labs   Lab 06/13/24  0711   PHOS 1.6*          Elevated LFTs  Lab Results   Component Value Date     (H) 06/13/2024    AST 98 (H) 06/13/2024    ALKPHOS 108 06/13/2024    BILITOT 0.6 06/13/2024   Daily CMP.  Tbili WNL. LFTs trending down.    Hypokalemia  Patient has hypokalemia which is Acute and currently uncontrolled. Most recent potassium levels reviewed-   Lab Results   Component Value Date    K 3.4 (L) 06/13/2024   . Will continue potassium replacement per protocol and recheck repeat levels after replacement completed.       Type 2 diabetes mellitus without complication, without long-term current use of insulin  Newly dx DMII  A1c 10;  glucose 333 on admission  IP consult to IM for management   SSI   POCT glucose        GT MCGREGOR NP  General Surgery  [facility]

## 2024-06-13 NOTE — PLAN OF CARE
Recommendations  1. Rec'd when medically appropriate, pt's diet be advanced to Cardiac Consistent CHO diet.   2. RD to provide Diabetes diet education.   3. RD to follow and make rec's accordingly.  Goals:   1.Pt's diet will be advanced by next RD follow up.   2. Pt will consume > 50% of meals by next RD follow up.  Nutrition Goal Status: new

## 2024-06-13 NOTE — CONSULTS
[facility] Medicine  Consult Note    Patient Name: Justino Fortune  MRN: [id number]  Admission Date: 6/11/2024  Hospital Length of Stay: 1 days  Attending Physician: Kary Ackerman MD   Primary Care Provider: Sayda Primary Doctor           Patient information was obtained from patient and ER records.     Consults  Subjective:     Principal Problem: Cholecystitis with perforation of gallbladder    Chief Complaint:   Chief Complaint   Patient presents with    Abdominal Pain     Patient reports 10/10, sharp, RLQ pain onset 2 hours ago. Denies N/V/D.         HPI: 39-year-old male who presents with 2 day history of severe right upper quadrant pain and nausea. Has history of self-reported gastritis but thinks it may have been his gallbladder. In emergency department, CT abdomen and pelvis demonstrated supra active cholecystitis with inflammation of the adjacent liver. WBC within normal limits. T bili 1.2. ALP elevated; glucose 333 - no reported history of diabetes. General surgery consulted for evaluation   PT started on iv abxs, npo status.  Pt had surgery on 6/11/24 with an open cholecystectomy - found to have puralence and perforated gallbladder with gallstones.  This am, pt is feelign ok - some mild abd pain.    Explained to pt that he is a newly diagnosed diabetic (A1c is 10.9) - dicussed some changes he will need to in his diet when able to eat - will start low dose insulin with ssi for now to lower bs        Past Medical History:   Diagnosis Date    Gastritis        Past Surgical History:   Procedure Laterality Date    CHOLECYSTECTOMY N/A 6/11/2024    Procedure: CHOLECYSTECTOMY;  Surgeon: Kary Ackerman MD;  Location: [facility] OR;  Service: General;  Laterality: N/A;  Partial    LAPAROSCOPIC CHOLECYSTECTOMY N/A 6/11/2024    Procedure: CHOLECYSTECTOMY, LAPAROSCOPIC;  Surgeon: Kary Ackerman MD;  Location: [facility] OR;  Service: General;  Laterality: N/A;  CONVERTED TO OPEN       Review  of patient's allergies indicates:  No Known Allergies    No current facility-administered medications on file prior to encounter.     No current outpatient medications on file prior to encounter.     Family History    None       Tobacco Use    Smoking status: Not on file    Smokeless tobacco: Not on file   Substance and Sexual Activity    Alcohol use: Not on file    Drug use: Not on file    Sexual activity: Not on file     Review of Systems   Constitutional:  Negative for activity change, fatigue and fever.   HENT:  Negative for mouth sores, sinus pressure and sore throat.    Respiratory:  Negative for cough, shortness of breath and wheezing.    Cardiovascular:  Negative for chest pain, palpitations and leg swelling.   Gastrointestinal:  Positive for abdominal distention and abdominal pain. Negative for diarrhea and vomiting.   Musculoskeletal:  Negative for back pain and neck pain.   Skin:  Negative for rash and wound.   Neurological:  Negative for weakness.     Objective:     Vital Signs (Most Recent):  Temp: 99.2 °F (37.3 °C) (06/13/24 0015)  Pulse: 67 (06/13/24 0735)  Resp: 18 (06/13/24 0735)  BP: 128/77 (06/13/24 0600)  SpO2: 97 % (06/13/24 0735) Vital Signs (24h Range):  Temp:  [98.7 °F (37.1 °C)-99.7 °F (37.6 °C)] 99.2 °F (37.3 °C)  Pulse:  [65-89] 67  Resp:  [10-33] 18  SpO2:  [86 %-98 %] 97 %  BP: (114-141)/(63-83) 128/77     Weight: 106.1 kg (234 lb)  Body mass index is 35.58 kg/m².     Physical Exam  Constitutional:       Appearance: He is ill-appearing.   HENT:      Nose: Nose normal.      Mouth/Throat:      Pharynx: Oropharynx is clear.   Eyes:      Extraocular Movements: Extraocular movements intact.      Pupils: Pupils are equal, round, and reactive to light.   Cardiovascular:      Rate and Rhythm: Normal rate and regular rhythm.   Pulmonary:      Effort: Pulmonary effort is normal.      Breath sounds: Normal breath sounds.   Abdominal:      Palpations: Abdomen is soft.      Tenderness: There is  abdominal tenderness.   Musculoskeletal:         General: Normal range of motion.      Cervical back: Normal range of motion.   Skin:     General: Skin is warm and dry.      Capillary Refill: Capillary refill takes less than 2 seconds.   Neurological:      General: No focal deficit present.      Mental Status: He is alert.          Significant Labs: All pertinent labs within the past 24 hours have been reviewed.  A1C:   Recent Labs   Lab 06/11/24  0511   HGBA1C 10.9*     CBC:   Recent Labs   Lab 06/11/24 2016 06/12/24 0657 06/13/24  0711   WBC 10.65 9.77 9.85   HGB 12.6* 11.7* 10.3*   HCT 37.1* 35.5* 30.3*    169 189     CMP:   Recent Labs   Lab 06/11/24 2016 06/12/24 0657 06/13/24  0711   * 133* 134*   K 5.4* 3.8 3.4*    103 102   CO2 20* 24 25   * 240* 219*   BUN 16 12 12   CREATININE 1.0 0.8 0.7   CALCIUM 6.6* 7.1* 7.5*   PROT 5.7* 5.4* 5.5*   ALBUMIN 2.0* 1.8* 1.7*   BILITOT 1.3* 0.9 0.6   ALKPHOS 121 111 108   * 285* 98*   * 289* 189*   ANIONGAP 5 6 7       Significant Imaging: I have reviewed all pertinent imaging results/findings within the past 24 hours.  Assessment/Plan:     * Cholecystitis with perforation of gallbladder  Sp open cholecystectomy  Iv abx  Surgery following      Elevated LFTs  Appears sec to recent cholecystitis - improving - monitor levels      Hypokalemia  Patient has hypokalemia which is Acute and currently uncontrolled. Most recent potassium levels reviewed-   Lab Results   Component Value Date    K 3.4 (L) 06/13/2024   . Will continue potassium replacement per protocol and recheck repeat levels after replacement completed.     Type 2 diabetes mellitus without complication, without long-term current use of insulin  Patient's FSGs are uncontrolled due to hyperglycemia on current medication regimen.  Last A1c reviewed-   Lab Results   Component Value Date    HGBA1C 10.9 (H) 06/11/2024     Most recent fingerstick glucose reviewed-   Recent Labs    Lab 06/12/24  1203 06/12/24  1806 06/12/24  2350 06/13/24  0558   POCTGLUCOSE 227* 248* 219* 213*     Current correctional scale  Low  Maintain anti-hyperglycemic dose as follows-   Antihyperglycemics (From admission, onward)      Start     Stop Route Frequency Ordered    06/13/24 0900  insulin detemir U-100 (Levemir) pen 8 Units         -- SubQ Daily 06/13/24 0749    06/12/24 1607  insulin aspart U-100 pen 0-10 Units         -- SubQ Every 6 hours PRN 06/12/24 1508          Hold Oral hypoglycemics while patient is in the hospital.    Pt is newly diagnosed dm- A1c is 10.9% - information on diet for diabetes - will start lantus 8u daily to start gettign bs better controlled      VTE Risk Mitigation (From admission, onward)           Ordered     IP VTE LOW RISK PATIENT  Once         06/11/24 0651     Place JORJE hose  Until discontinued         06/11/24 0651                        Thank you for your consult. I will follow-up with patient. Please contact us if you have any additional questions.    Avani Martinez MD  Department of Hospital Medicine   [place] - Intensive Care

## 2024-06-13 NOTE — ASSESSMENT & PLAN NOTE
Patient has Abnormal Phosphorus: hypophosphatemia. Will continue to monitor electrolytes closely. Will replace the affected electrolytes and repeat labs to be done after interventions completed. The patient's phosphorus results have been reviewed and are listed below.  Recent Labs   Lab 06/13/24  0711   PHOS 1.6*

## 2024-06-13 NOTE — NURSING TRANSFER
Nursing Transfer Note      6/13/2024   11:39 AM    Nurse giving handoff:mazin mathew  Nurse receiving handoff:kevin Aceves    Reason patient is being transferred: ICU no longer required    Transfer To: 629/207    Transfer via wheelchair    Transported by mazin Mathew    Transfer Vital Signs:  Blood Pressure:144/82  Heart Rate:78  O2:96  Temperature:98.6    Respiratory Rate:20      Additional Lines: SANDRA drains X2    4eyes on Skin: yes    Medicines sent: y    Any special needs or follow-up needed: Lao speaking    Patient belongings transferred with patient: Yes    Chart send with patient: Yes    Notified: Patient declined nurse calling chuck Herrear.    Upon arrival to floor: patient oriented to room, call bell in reach, and bed in lowest position, patient assisted to couch in room per request

## 2024-06-13 NOTE — NURSING
Lying in bed awake. Alert and oriented times 4. Voices c/o abdominal pain 4/10. Abdomen taunt and tender. Right upper quadrant incision covered with  silver dressing and it is clean, dry, and intact. Right quadrant SANDRA to bulb suction. No drainage noted in bulb. Left quadrant incision with gauze dressing and it is clean, dry, and intact. Left quadrant SANDRA  to bulb suction 40 ml of serosanquineous drainage. NSR on cardiac monitor. NS infusing at 150 ml/hr. O2 at 2 liters NC. MAEW. SCD's to BLE's. Patient can reposition self in bed. Reviewed plan of care with patient. Will continue to monitor.    0615 uneventful night. Sleeping. No distress assessed at this time. All dressings to abdomen are clean, dry, and intact. SANDRA drains times 2 are to bulb suction. NSR on cardiac monitor. NS infusing at 150 ml/hr. O2 at 2 liters NC sat 95%. SCD's to BLE's.  Will continue to monitor.

## 2024-06-13 NOTE — SUBJECTIVE & OBJECTIVE
Interval History: Seen and examined.  DONALD.  Difficulty voiding after guzman removal this afternoon.  Pain moderately controlled with medication.      Medications:  Continuous Infusions:   sodium chloride 0.9%   Intravenous Continuous 150 mL/hr at 06/13/24 0752 New Bag at 06/13/24 0752     Scheduled Meds:   enoxparin  40 mg Subcutaneous Q24H (prophylaxis, 1700)    famotidine (PF)  20 mg Intravenous Q12H    insulin detemir U-100  8 Units Subcutaneous Daily    ketorolac  15 mg Intravenous Q6H    methocarbamoL  1,000 mg Per NG tube QID    mupirocin   Nasal BID    potassium chloride  10 mEq Intravenous Q1H    potassium, sodium phosphates  2 packet Oral QID (AC & HS)     PRN Meds:  Current Facility-Administered Medications:     dextrose 10%, 12.5 g, Intravenous, PRN    dextrose 10%, 25 g, Intravenous, PRN    dextrose 5 % (D5W), , Intravenous, PRN    glucagon (human recombinant), 1 mg, Intramuscular, PRN    HYDROcodone-acetaminophen, 1 tablet, Oral, Q4H PRN    insulin aspart U-100, 0-10 Units, Subcutaneous, Q6H PRN    melatonin, 6 mg, Oral, Nightly PRN    morphine, 4 mg, Intravenous, Q4H PRN    ondansetron, 4 mg, Intravenous, Q8H PRN    prochlorperazine, 5 mg, Intravenous, Q6H PRN    sodium chloride 0.9%, 10 mL, Intravenous, PRN     Review of patient's allergies indicates:  No Known Allergies  Objective:     Vital Signs (Most Recent):  Temp: 98.6 °F (37 °C) (06/13/24 1045)  Pulse: 71 (06/13/24 1105)  Resp: 18 (06/13/24 1105)  BP: (!) 144/82 (06/13/24 1045)  SpO2: 96 % (06/13/24 1105) Vital Signs (24h Range):  Temp:  [98.5 °F (36.9 °C)-99.7 °F (37.6 °C)] 98.6 °F (37 °C)  Pulse:  [65-89] 71  Resp:  [10-33] 18  SpO2:  [91 %-98 %] 96 %  BP: (116-147)/(63-83) 144/82     Weight: 106.1 kg (234 lb)  Body mass index is 35.58 kg/m².    Intake/Output - Last 3 Shifts         06/11 0700 06/12 0659 06/12 0700 06/13 0659 06/13 0700 06/14 0659    P.O.  160     I.V. (mL/kg) 8908.2 (84) 3559 (33.5) 267.5 (2.5)    NG/GT 80      IV  Piggyback 300 299.2 108.3    Total Intake(mL/kg) 9288.2 (87.5) 4018.1 (37.9) 375.8 (3.5)    Urine (mL/kg/hr) 2025 (0.8) 1775 (0.7) 550 (0.9)    Drains 365 355 60    Blood 1000      Total Output 3390 2130 610    Net +5898.2 +1888.1 -234.2           Urine Occurrence  1 x              Physical Exam  Vitals reviewed.   Constitutional:       General: He is not in acute distress.     Appearance: He is not ill-appearing or toxic-appearing.   HENT:      Head: Normocephalic and atraumatic.      Mouth/Throat:      Mouth: Mucous membranes are moist.   Eyes:      Conjunctiva/sclera: Conjunctivae normal.   Cardiovascular:      Rate and Rhythm: Normal rate and regular rhythm.   Pulmonary:      Effort: Pulmonary effort is normal. No respiratory distress.   Abdominal:      General: There is no distension.      Palpations: Abdomen is soft.      Tenderness: There is abdominal tenderness. There is no guarding or rebound.      Comments: RUQ incision with surgical dressing in place   RUQ SANDRA drain with serosanguinous drainage with slight bile tinge   Suprapubic drain in place with serosang drainage in place    Musculoskeletal:      Cervical back: Normal range of motion and neck supple.      Right lower leg: No edema.      Left lower leg: No edema.   Skin:     General: Skin is warm and dry.      Capillary Refill: Capillary refill takes less than 2 seconds.   Neurological:      Mental Status: He is alert. Mental status is at baseline.          Significant Labs:  I have reviewed all pertinent lab results within the past 24 hours.  CBC:   Recent Labs   Lab 06/13/24  0711   WBC 9.85   RBC 3.72*   HGB 10.3*   HCT 30.3*      MCV 82   MCH 27.7   MCHC 34.0     CMP:   Recent Labs   Lab 06/13/24  0711   *   CALCIUM 7.5*   ALBUMIN 1.7*   PROT 5.5*   *   K 3.4*   CO2 25      BUN 12   CREATININE 0.7   ALKPHOS 108   *   AST 98*   BILITOT 0.6       Significant Diagnostics:  I have reviewed all pertinent imaging  results/findings within the past 24 hours.

## 2024-06-13 NOTE — ASSESSMENT & PLAN NOTE
Patient's FSGs are uncontrolled due to hyperglycemia on current medication regimen.  Last A1c reviewed-   Lab Results   Component Value Date    HGBA1C 10.9 (H) 06/11/2024     Most recent fingerstick glucose reviewed-   Recent Labs   Lab 06/12/24  1203 06/12/24  1806 06/12/24  2350 06/13/24  0558   POCTGLUCOSE 227* 248* 219* 213*     Current correctional scale  Low  Maintain anti-hyperglycemic dose as follows-   Antihyperglycemics (From admission, onward)      Start     Stop Route Frequency Ordered    06/13/24 0900  insulin detemir U-100 (Levemir) pen 8 Units         -- SubQ Daily 06/13/24 0749    06/12/24 1607  insulin aspart U-100 pen 0-10 Units         -- SubQ Every 6 hours PRN 06/12/24 1508          Hold Oral hypoglycemics while patient is in the hospital.    Pt is newly diagnosed dm- A1c is 10.9% - information on diet for diabetes - will start lantus 8u daily to start gettign bs better controlled

## 2024-06-13 NOTE — ASSESSMENT & PLAN NOTE
Patient has hypokalemia which is Acute and currently uncontrolled. Most recent potassium levels reviewed-   Lab Results   Component Value Date    K 3.4 (L) 06/13/2024   . Will continue potassium replacement per protocol and recheck repeat levels after replacement completed.

## 2024-06-13 NOTE — CONSULTS
"  [facility] Surg  Adult Nutrition  Consult Note    SUMMARY     Recommendations  1. Rec'd when medically appropriate, pt's diet be advanced to Cardiac Consistent CHO diet.   2. RD to provide Diabetes diet education.   3. RD to follow and make rec's accordingly.  Goals:   1.Pt's diet will be advanced by next RD follow up.   2. Pt will consume > 50% of meals by next RD follow up.  Nutrition Goal Status: new  Communication of RD Recs: reviewed with RN    Assessment and Plan    Nutrition Problem  Food and nutrition related knowledge deficit    Related to (etiology):   New onset diabetes dx    Signs and Symptoms (as evidenced by):   No prior nutrition education; BG = 219    Interventions/Recommendations (treatment strategy):  1. Rec'd when medically appropriate, pt's diet be advanced to Cardiac Consistent CHO diet.   2. RD to provide Diabetes diet education.   3. RD to follow and make rec's accordingly.     Nutrition Diagnosis Status:   New    Reason for Assessment    Reason For Assessment: consult  Diagnosis: other (see comments) (Cholecystitis with perforation of gallbladder)  Relevant Medical History: Gastritis, Diabetes  Interdisciplinary Rounds: did not attend  General Information Comments: RD consulted for Diabetes diet education. Pt is [place] speaking and RD will use language line to educate pt.  Pt was admitted for Cholecystitis but was recently diagnosed with Diabetes. Pt's current BG is 219 and was >300 when first admitted. RD to provide diet education at follow up. RD to follow and make rec's accordingly.  Nutrition Discharge Planning: TBD as care progresses    Nutrition Risk Screen    Nutrition Risk Screen: no indicators present    Nutrition/Diet History    Patient Reported Diet/Restrictions/Preferences: general  Spiritual, Cultural Beliefs, Jew Practices, Values that Affect Care: no  Food Allergies: NKFA    Anthropometrics    Temp: 98.6 °F (37 °C)  Height: 5' 8" (172.7 cm)  Height (inches): 68 " in  Weight Method: Bed Scale  Weight: 106.1 kg (233 lb 14.5 oz)  Weight (lb): 233.91 lb  Ideal Body Weight (IBW), Male: 154 lb  % Ideal Body Weight, Male (lb): 151.89 %  BMI (Calculated): 35.6  BMI Grade: 35 - 39.9 - obesity - grade II    Lab/Procedures/Meds    Pertinent Labs Reviewed: reviewed  Pertinent Labs Comments: Glucose = 219, Albumin = 1.7, Sodium = 134, Potassium = 3.4  Pertinent Medications Reviewed: reviewed    Estimated/Assessed Needs    Weight Used For Calorie Calculations: 78.8 kg (173 lb 12 oz) (AdjBW)  Energy Calorie Requirements (kcal): 1970 (25kcal/kg AdjBW)  Energy Need Method: Kcal/kg  Protein Requirements: 56-70 (0.8-1.0g/kg IBW)  Weight Used For Protein Calculations: 69.9 kg (154 lb) (IBW)  Fluid Requirements (mL): 1970 (1mL/kcal)  Estimated Fluid Requirement Method: RDA Method  RDA Method (mL): 1970    Nutrition Prescription Ordered    Current Diet Order: Clear Liquids  Oral Nutrition Supplement: None    Evaluation of Received Nutrient/Fluid Intake    % Kcal Needs: 0-25%  % Protein Needs: 0-25%  I/O: - 234.2  Energy Calories Required: not meeting needs  Protein Required: not meeting needs  Tolerance: tolerating  % Intake of Estimated Energy Needs: 0 - 25 %  % Meal Intake: 0 - 25 %    Nutrition Risk    Level of Risk/Frequency of Follow-up: moderate     Monitor and Evaluation    Food and Nutrient Intake: energy intake, food and beverage intake  Food and Nutrient Adminstration: diet order  Knowledge/Beliefs/Attitudes: food and nutrition knowledge/skill, beliefs and attitudes  Physical Activity and Function: nutrition-related ADLs and IADLs  Anthropometric Measurements: height/length, weight, weight change, body mass index  Biochemical Data, Medical Tests and Procedures: electrolyte and renal panel, gastrointestinal profile, glucose/endocrine profile, inflammatory profile, lipid profile  Nutrition-Focused Physical Findings: overall appearance     Nutrition Follow-Up    RD Follow-up?: Yes

## 2024-06-13 NOTE — PLAN OF CARE
Problem: Adult Inpatient Plan of Care  Goal: Plan of Care Review  6/13/2024 0848 by Priyanka Galicia RN  Outcome: Progressing  6/13/2024 0848 by Priyanka Galicia RN  Outcome: Progressing     Problem: Adult Inpatient Plan of Care  Goal: Patient-Specific Goal (Individualized)  6/13/2024 0848 by Priyanka Galicia RN  Outcome: Progressing  6/13/2024 0848 by Priyanka Galicia RN  Outcome: Progressing     Problem: Adult Inpatient Plan of Care  Goal: Optimal Comfort and Wellbeing  6/13/2024 0848 by Priyanka Galicia RN  Outcome: Progressing  6/13/2024 0848 by Priyanka Galicia RN  Outcome: Progressing

## 2024-06-13 NOTE — HOSPITAL COURSE
06/13 CP: S/p open cholecystectomy POD#2. Abdominal pain moderately controlled. Bilateral SANDRA drains with serosanguinous drainage. Tbili 0.6. LFTs trending down. Electrolyte abnormalities noted. Stable for transfer to med surg unit.   On POD #4, pelvic and subhepatic drains removed.  No bilious output after resuming patient on PO diet.  Pain controlled with PO medication and deemed eligible for discharge.  DM education reviewed with patient and translated instructions given.  To follow up with general surgery in a week for follow up.

## 2024-06-14 LAB
ALBUMIN SERPL BCP-MCNC: 1.7 G/DL (ref 3.5–5.2)
ALP SERPL-CCNC: 128 U/L (ref 55–135)
ALT SERPL W/O P-5'-P-CCNC: 139 U/L (ref 10–44)
ANION GAP SERPL CALC-SCNC: 7 MMOL/L (ref 3–11)
AST SERPL-CCNC: 57 U/L (ref 10–40)
BILIRUB SERPL-MCNC: 0.7 MG/DL (ref 0.1–1)
BUN SERPL-MCNC: 7 MG/DL (ref 6–20)
CALCIUM SERPL-MCNC: 7.8 MG/DL (ref 8.7–10.5)
CHLORIDE SERPL-SCNC: 102 MMOL/L (ref 95–110)
CO2 SERPL-SCNC: 26 MMOL/L (ref 23–29)
CREAT SERPL-MCNC: 0.5 MG/DL (ref 0.5–1.4)
ERYTHROCYTE [DISTWIDTH] IN BLOOD BY AUTOMATED COUNT: 13.2 % (ref 11.5–14.5)
EST. GFR  (NO RACE VARIABLE): >60 ML/MIN/1.73 M^2
GLUCOSE SERPL-MCNC: 170 MG/DL (ref 70–110)
HCT VFR BLD AUTO: 30.2 % (ref 40–54)
HGB BLD-MCNC: 10.2 G/DL (ref 14–18)
MCH RBC QN AUTO: 27.5 PG (ref 27–31)
MCHC RBC AUTO-ENTMCNC: 33.8 G/DL (ref 32–36)
MCV RBC AUTO: 81 FL (ref 82–98)
PLATELET # BLD AUTO: 236 K/UL (ref 150–450)
PMV BLD AUTO: 11.2 FL (ref 9.2–12.9)
POCT GLUCOSE: 202 MG/DL (ref 70–110)
POCT GLUCOSE: 224 MG/DL (ref 70–110)
POTASSIUM SERPL-SCNC: 3.2 MMOL/L (ref 3.5–5.1)
PROT SERPL-MCNC: 5.7 G/DL (ref 6–8.4)
RBC # BLD AUTO: 3.71 M/UL (ref 4.6–6.2)
SODIUM SERPL-SCNC: 135 MMOL/L (ref 136–145)
WBC # BLD AUTO: 7.92 K/UL (ref 3.9–12.7)

## 2024-06-14 PROCEDURE — 80053 COMPREHEN METABOLIC PANEL: CPT

## 2024-06-14 PROCEDURE — 25000003 PHARM REV CODE 250

## 2024-06-14 PROCEDURE — 63600175 PHARM REV CODE 636 W HCPCS: Mod: JZ,JG | Performed by: STUDENT IN AN ORGANIZED HEALTH CARE EDUCATION/TRAINING PROGRAM

## 2024-06-14 PROCEDURE — 94799 UNLISTED PULMONARY SVC/PX: CPT

## 2024-06-14 PROCEDURE — 85027 COMPLETE CBC AUTOMATED: CPT

## 2024-06-14 PROCEDURE — 25000003 PHARM REV CODE 250: Performed by: INTERNAL MEDICINE

## 2024-06-14 PROCEDURE — 21400001 HC TELEMETRY ROOM

## 2024-06-14 PROCEDURE — 25000003 PHARM REV CODE 250: Performed by: STUDENT IN AN ORGANIZED HEALTH CARE EDUCATION/TRAINING PROGRAM

## 2024-06-14 PROCEDURE — 25000242 PHARM REV CODE 250 ALT 637 W/ HCPCS: Performed by: STUDENT IN AN ORGANIZED HEALTH CARE EDUCATION/TRAINING PROGRAM

## 2024-06-14 PROCEDURE — 94761 N-INVAS EAR/PLS OXIMETRY MLT: CPT

## 2024-06-14 PROCEDURE — 99900035 HC TECH TIME PER 15 MIN (STAT)

## 2024-06-14 PROCEDURE — 63600175 PHARM REV CODE 636 W HCPCS: Performed by: INTERNAL MEDICINE

## 2024-06-14 PROCEDURE — 99900031 HC PATIENT EDUCATION (STAT)

## 2024-06-14 PROCEDURE — 94640 AIRWAY INHALATION TREATMENT: CPT

## 2024-06-14 PROCEDURE — 36415 COLL VENOUS BLD VENIPUNCTURE: CPT

## 2024-06-14 RX ORDER — POTASSIUM CHLORIDE 20 MEQ/1
40 TABLET, EXTENDED RELEASE ORAL 2 TIMES DAILY
Status: DISCONTINUED | OUTPATIENT
Start: 2024-06-14 | End: 2024-06-15 | Stop reason: HOSPADM

## 2024-06-14 RX ORDER — POLYETHYLENE GLYCOL 3350 17 G/17G
17 POWDER, FOR SOLUTION ORAL DAILY
Status: DISCONTINUED | OUTPATIENT
Start: 2024-06-14 | End: 2024-06-15 | Stop reason: HOSPADM

## 2024-06-14 RX ORDER — SODIUM CHLORIDE AND POTASSIUM CHLORIDE 150; 900 MG/100ML; MG/100ML
INJECTION, SOLUTION INTRAVENOUS CONTINUOUS
Status: DISCONTINUED | OUTPATIENT
Start: 2024-06-14 | End: 2024-06-15 | Stop reason: HOSPADM

## 2024-06-14 RX ORDER — IPRATROPIUM BROMIDE AND ALBUTEROL SULFATE 2.5; .5 MG/3ML; MG/3ML
3 SOLUTION RESPIRATORY (INHALATION)
Status: DISCONTINUED | OUTPATIENT
Start: 2024-06-14 | End: 2024-06-15 | Stop reason: HOSPADM

## 2024-06-14 RX ADMIN — KETOROLAC TROMETHAMINE 15 MG: 30 INJECTION, SOLUTION INTRAMUSCULAR; INTRAVENOUS at 11:06

## 2024-06-14 RX ADMIN — KETOROLAC TROMETHAMINE 15 MG: 30 INJECTION, SOLUTION INTRAMUSCULAR; INTRAVENOUS at 05:06

## 2024-06-14 RX ADMIN — IPRATROPIUM BROMIDE AND ALBUTEROL SULFATE 3 ML: 2.5; .5 SOLUTION RESPIRATORY (INHALATION) at 09:06

## 2024-06-14 RX ADMIN — FAMOTIDINE 20 MG: 10 INJECTION, SOLUTION INTRAVENOUS at 08:06

## 2024-06-14 RX ADMIN — POTASSIUM CHLORIDE 40 MEQ: 1500 TABLET, EXTENDED RELEASE ORAL at 08:06

## 2024-06-14 RX ADMIN — INSULIN ASPART 2 UNITS: 100 INJECTION, SOLUTION INTRAVENOUS; SUBCUTANEOUS at 11:06

## 2024-06-14 RX ADMIN — POLYETHYLENE GLYCOL (3350) 17 G: 17 POWDER, FOR SOLUTION ORAL at 02:06

## 2024-06-14 RX ADMIN — ENOXAPARIN SODIUM 40 MG: 40 INJECTION SUBCUTANEOUS at 05:06

## 2024-06-14 RX ADMIN — MUPIROCIN: 20 OINTMENT TOPICAL at 10:06

## 2024-06-14 RX ADMIN — POTASSIUM & SODIUM PHOSPHATES POWDER PACK 280-160-250 MG 2 PACKET: 280-160-250 PACK at 05:06

## 2024-06-14 RX ADMIN — KETOROLAC TROMETHAMINE 15 MG: 30 INJECTION, SOLUTION INTRAMUSCULAR; INTRAVENOUS at 01:06

## 2024-06-14 RX ADMIN — SODIUM CHLORIDE: 9 INJECTION, SOLUTION INTRAVENOUS at 01:06

## 2024-06-14 RX ADMIN — MUPIROCIN: 20 OINTMENT TOPICAL at 02:06

## 2024-06-14 RX ADMIN — METHOCARBAMOL 1000 MG: 500 TABLET ORAL at 08:06

## 2024-06-14 RX ADMIN — METHOCARBAMOL 1000 MG: 500 TABLET ORAL at 05:06

## 2024-06-14 RX ADMIN — HYDROCODONE BITARTRATE AND ACETAMINOPHEN 1 TABLET: 10; 325 TABLET ORAL at 03:06

## 2024-06-14 RX ADMIN — METHOCARBAMOL 1000 MG: 500 TABLET ORAL at 01:06

## 2024-06-14 RX ADMIN — SODIUM CHLORIDE AND POTASSIUM CHLORIDE: .9; .15 SOLUTION INTRAVENOUS at 08:06

## 2024-06-14 RX ADMIN — MORPHINE SULFATE 4 MG: 4 INJECTION, SOLUTION INTRAMUSCULAR; INTRAVENOUS at 11:06

## 2024-06-14 RX ADMIN — MUPIROCIN: 20 OINTMENT TOPICAL at 08:06

## 2024-06-14 NOTE — HOSPITAL COURSE
6/14 tolerating clear liquid diet - does have some ruq abd pain but better than before surgery.   Cont to replace potassium and adjust insulin therapy    6/15:  No acute events overnight.  The patient is responding nicely to current treatment regimen.  Titrate as needed.   Yes

## 2024-06-14 NOTE — PLAN OF CARE
Plan of care reviewed. Patient remained free of falls and tolerated all medications this shift.Right sided IV in place, patent, flushed and saline locked. Left side IV in place and infusing  0.9 @ 150 ml/hr.No redness, swelling or drainage noted. A/O x.4 Patient is in bed resting. Visible rise and fall noted. No signs of acute stress. RA . Independent.  Continent to bowel and bladder. Franklyn drains in place and draining well. Translater used for language barrier. Denies pain at this time.  Call bell and remote in reach. Bed locked and in lowest position. All belongings in reach. No further concerns at this time.       Problem: Adult Inpatient Plan of Care  Goal: Plan of Care Review  Outcome: Progressing  Goal: Patient-Specific Goal (Individualized)  Outcome: Progressing  Goal: Absence of Hospital-Acquired Illness or Injury  Outcome: Progressing  Goal: Optimal Comfort and Wellbeing  Outcome: Progressing  Goal: Readiness for Transition of Care  Outcome: Progressing     Problem: Pain Acute  Goal: Optimal Pain Control and Function  Outcome: Progressing     Problem: Cholecystectomy  Goal: Absence of Bleeding  Outcome: Progressing  Goal: Effective Bowel Elimination  Outcome: Progressing  Goal: Fluid and Electrolyte Balance  Outcome: Progressing  Goal: Absence of Infection Signs and Symptoms  Outcome: Progressing  Goal: Anesthesia/Sedation Recovery  Outcome: Progressing  Goal: Pain Control and Function  Outcome: Progressing  Goal: Nausea and Vomiting Relief  Outcome: Progressing  Goal: Effective Urinary Elimination  Outcome: Progressing  Goal: Effective Oxygenation and Ventilation  Outcome: Progressing

## 2024-06-14 NOTE — OP NOTE
[facility]  General Surgery Department  Operative Note    SUMMARY     Date of Procedure: 6/11/2024     Procedure: Procedure(s) (LRB):   Exploratory laparoscopy  Open cholecystectomy      Surgeon(s) and Role:  Kary Ackerman MD     Pre-Operative Diagnosis: Pre-Op Diagnosis Codes:     * Cholecystitis [K81.9]     Post-Operative Diagnosis:  Acute calculous cholecystitis with perforation and gangrene      Anesthesia: General     Findings:   Laparoscopic irrigation and drainage of pelvic fluid with drain placement - 200cc purulence evacuated   Converted to open - Gallbladder necrotic with large perforation at the fundus   Multiple large gallstones   Partial cholecystectomy   Slow bile leak from single location on liver bed - 15Fr round drain left in place     Indications for the Procedure:  39-year-old male with previously undiagnosed diabetes who presents with one day history of acute abdominal right upper quadrant abdominal pain nausea and vomiting.  CT abdomen and pelvis demonstrated perforated cholecystitis with free fluid in the pelvis.  Glucose at the time over 300 with A1c of 10.    Operative Conduct in Detail:   After informed consent was obtained the patient was brought to the operating room and placed in the supine position where endotracheal intubation was initiated.  SCDs were placed before induction.  A Patterson catheter placed.  Patient was on around the clock antibiotics on the floor.  The abdomen was prepped and draped in sterile fashion.  A time-out was performed in the operating room with all present in agreement.      An infraumbilical incision was made and carried down with electrocautery through skin and subcutaneous tissue.  The layers of the rectus sheath were incised using the cutdown method and a 12 mm port was inserted.  Pneumoperitoneum was initiated with the patient tolerating it well.  Two additional trocars in the suprapubic and left lower quadrant positions were  also placed.  Patient with a copious amount of turbid purulent fluid in the pelvis which was evacuated.  Cultures were obtained.  About 300 cc was evacuated.  A round channel drain was placed in the pelvis through the suprapubic incision and secured in place.  Pneumoperitoneum was relieved and the umbilical incision closed with 0 Ethibond interrupted sutures.  Skin was closed with staples of the remaining port sites.    We then turned toward our open cholecystectomy by making a chevron incision under the right subcostal margin.  Incision was carried down through skin electrocautery skin subcutaneous tissue and rectus muscle until the abdominal cavity was safely entered.  A Balfor retractor was placed.  The gallbladder was completely covered by a thick layer of omentum which was carefully dissected away with a combination of blunt finger dissection and electrocautery.  The gallbladder had a 1.5 x 1.5 cm perforation at the infundibulum with multiple large stones readily visualized within the lumen.  The gallbladder was removed from the liver in a top-down approach, taking great pains to identified the plane between the gallbladder and the liver parenchyma, which was difficult due to the friability of the gallbladder and lack readily identifiable plane between the two.  We countered some bleeding from a liver sinusoid which was controlled with surgery clips.  When the gallbladder had been taken off the liver the junction between the cystic duct and the fundus was identified and two Endoloops were placed.  The gallbladder was transected 2 cm distal to that and then oversewn with an interlocking 0 PDS suture.  There was a small bile leak from the liver parenchyma at the lateral portion of the fossa, which was controlled intraop.  The abdomen was copiously irrigated with normal saline and a round channel drain was placed in the gallbladder fossa and covered with the omentum.  The chevron incision was closed in multiple  layers and skin closed with staples.  Sterile dressing was applied.  Patient was then awakened from anesthesia and taken back to the ICU extubated and in stable condition.  All lap and instrument counts were correct x2 prior to and after fascial closure.      Complications: No    Estimated Blood Loss (EBL): 700cc           Implants: * No implants in log *    Specimens:   Specimens (From admission, onward)       Start     Ordered    06/11/24 1830  Specimen to Pathology, Surgery General Surgery  Once        Comments: Pre-op Diagnosis: Cholecystitis [K81.9]Procedure(s):CHOLECYSTECTOMY, LAPAROSCOPICCHOLECYSTECTOMY Number of specimens: 1Name of specimens: gallbladder & gall stones @ 1800     References:    Click here for ordering Quick Tip   Question Answer Comment   Procedure Type: General Surgery    Release to patient Immediate        06/11/24 1830                             Condition: Good    Disposition: PACU - hemodynamically stable.        Kary Ackerman MD  General Surgery   [phone]

## 2024-06-14 NOTE — ASSESSMENT & PLAN NOTE
Patient has hypokalemia which is Acute and currently uncontrolled. Most recent potassium levels reviewed-   Lab Results   Component Value Date    K 3.2 (L) 06/14/2024   . Will continue potassium replacement per protocol and recheck repeat levels after replacement completed.   6/14 add potassium in ivfs; po kdur bid

## 2024-06-14 NOTE — SUBJECTIVE & OBJECTIVE
Past Medical History:   Diagnosis Date    Gastritis        Past Surgical History:   Procedure Laterality Date    CHOLECYSTECTOMY N/A 6/11/2024    Procedure: CHOLECYSTECTOMY;  Surgeon: Kary Ackerman MD;  Location: [facility] OR;  Service: General;  Laterality: N/A;  Partial    LAPAROSCOPIC CHOLECYSTECTOMY N/A 6/11/2024    Procedure: CHOLECYSTECTOMY, LAPAROSCOPIC;  Surgeon: Kary Ackerman MD;  Location: [facility] OR;  Service: General;  Laterality: N/A;  CONVERTED TO OPEN       Review of patient's allergies indicates:  No Known Allergies    No current facility-administered medications on file prior to encounter.     No current outpatient medications on file prior to encounter.     Family History    None       Tobacco Use    Smoking status: Not on file    Smokeless tobacco: Not on file   Substance and Sexual Activity    Alcohol use: Not on file    Drug use: Not on file    Sexual activity: Not on file     Review of Systems   Constitutional:  Negative for activity change, fatigue and fever.   HENT:  Negative for mouth sores, sinus pressure and sore throat.    Respiratory:  Negative for cough, shortness of breath and wheezing.    Cardiovascular:  Negative for chest pain, palpitations and leg swelling.   Gastrointestinal:  Positive for abdominal distention and abdominal pain. Negative for diarrhea and vomiting.   Musculoskeletal:  Negative for back pain and neck pain.   Skin:  Negative for rash and wound.   Neurological:  Negative for weakness.     Objective:     Vital Signs (Most Recent):  Temp: 97.5 °F (36.4 °C) (06/14/24 0527)  Pulse: 61 (06/14/24 0527)  Resp: 18 (06/14/24 0527)  BP: 139/82 (06/14/24 0527)  SpO2: 95 % (06/14/24 0527) Vital Signs (24h Range):  Temp:  [97.5 °F (36.4 °C)-98.6 °F (37 °C)] 97.5 °F (36.4 °C)  Pulse:  [61-78] 61  Resp:  [18-22] 18  SpO2:  [95 %-97 %] 95 %  BP: (134-147)/(74-84) 139/82     Weight: 106.1 kg (233 lb 14.5 oz)  Body mass index is 35.57 kg/m².     Physical Exam  Constitutional:        Appearance: He is ill-appearing.   HENT:      Nose: Nose normal.      Mouth/Throat:      Pharynx: Oropharynx is clear.   Eyes:      Extraocular Movements: Extraocular movements intact.      Pupils: Pupils are equal, round, and reactive to light.   Cardiovascular:      Rate and Rhythm: Normal rate and regular rhythm.   Pulmonary:      Effort: Pulmonary effort is normal.      Breath sounds: Normal breath sounds.   Abdominal:      Palpations: Abdomen is soft.      Tenderness: There is abdominal tenderness.      Comments: 2 darins noted to ruq   Musculoskeletal:         General: Normal range of motion.      Cervical back: Normal range of motion.   Skin:     General: Skin is warm and dry.      Capillary Refill: Capillary refill takes less than 2 seconds.   Neurological:      General: No focal deficit present.      Mental Status: He is alert.          Significant Labs: All pertinent labs within the past 24 hours have been reviewed.  A1C:   Recent Labs   Lab 06/11/24  0511   HGBA1C 10.9*     CBC:   Recent Labs   Lab 06/13/24  0711 06/14/24  0513   WBC 9.85 7.92   HGB 10.3* 10.2*   HCT 30.3* 30.2*    236     CMP:   Recent Labs   Lab 06/13/24  0711 06/14/24  0513   * 135*   K 3.4* 3.2*    102   CO2 25 26   * 170*   BUN 12 7   CREATININE 0.7 0.5   CALCIUM 7.5* 7.8*   PROT 5.5* 5.7*   ALBUMIN 1.7* 1.7*   BILITOT 0.6 0.7   ALKPHOS 108 128   AST 98* 57*   * 139*   ANIONGAP 7 7       Significant Imaging: I have reviewed all pertinent imaging results/findings within the past 24 hours.

## 2024-06-14 NOTE — ASSESSMENT & PLAN NOTE
Patient's FSGs are uncontrolled due to hyperglycemia on current medication regimen.  Last A1c reviewed-   Lab Results   Component Value Date    HGBA1C 10.9 (H) 06/11/2024     Most recent fingerstick glucose reviewed-   Recent Labs   Lab 06/13/24  0814   POCTGLUCOSE 220*       Current correctional scale  Low  Maintain anti-hyperglycemic dose as follows-   Antihyperglycemics (From admission, onward)      Start     Stop Route Frequency Ordered    06/14/24 0900  insulin detemir U-100 (Levemir) pen 12 Units         -- SubQ Daily 06/14/24 0724    06/12/24 1607  insulin aspart U-100 pen 0-10 Units         -- SubQ Every 6 hours PRN 06/12/24 1508          Hold Oral hypoglycemics while patient is in the hospital.    Pt is newly diagnosed dm- A1c is 10.9% - information on diet for diabetes - will start lantus 8u daily to start gettign bs better controlled  6/14 increase insulin to 12u daily

## 2024-06-14 NOTE — PROGRESS NOTES
[facility] Medicine  Progress Note    Patient Name: Justino Fortune  MRN: [id number]  Patient Class: IP- Inpatient   Admission Date: 6/11/2024  Length of Stay: 2 days  Attending Physician: Kary Ackerman MD  Primary Care Provider: Sayda, Primary Doctor        Subjective:     Principal Problem:Cholecystitis with perforation of gallbladder        HPI:  39-year-old male who presents with 2 day history of severe right upper quadrant pain and nausea. Has history of self-reported gastritis but thinks it may have been his gallbladder. In emergency department, CT abdomen and pelvis demonstrated supra active cholecystitis with inflammation of the adjacent liver. WBC within normal limits. T bili 1.2. ALP elevated; glucose 333 - no reported history of diabetes. General surgery consulted for evaluation   PT started on iv abxs, npo status.  Pt had surgery on 6/11/24 with an open cholecystectomy - found to have puralence and perforated gallbladder with gallstones.  This am, pt is feelign ok - some mild abd pain.    Explained to pt that he is a newly diagnosed diabetic (A1c is 10.9) - dicussed some changes he will need to in his diet when able to eat - will start low dose insulin with ssi for now to lower bs        Overview/Hospital Course:  6/14 tolerating clear liquid diet - does have some ruq abd pain but better than before surgery.   Cont to replace potassium and adjust insulin therapy    Past Medical History:   Diagnosis Date    Gastritis        Past Surgical History:   Procedure Laterality Date    CHOLECYSTECTOMY N/A 6/11/2024    Procedure: CHOLECYSTECTOMY;  Surgeon: Kary Ackerman MD;  Location: [facility] OR;  Service: General;  Laterality: N/A;  Partial    LAPAROSCOPIC CHOLECYSTECTOMY N/A 6/11/2024    Procedure: CHOLECYSTECTOMY, LAPAROSCOPIC;  Surgeon: Kary Ackerman MD;  Location: [facility] OR;  Service: General;  Laterality: N/A;  CONVERTED TO OPEN       Review of patient's allergies indicates:  No  Known Allergies    No current facility-administered medications on file prior to encounter.     No current outpatient medications on file prior to encounter.     Family History    None       Tobacco Use    Smoking status: Not on file    Smokeless tobacco: Not on file   Substance and Sexual Activity    Alcohol use: Not on file    Drug use: Not on file    Sexual activity: Not on file     Review of Systems   Constitutional:  Negative for activity change, fatigue and fever.   HENT:  Negative for mouth sores, sinus pressure and sore throat.    Respiratory:  Negative for cough, shortness of breath and wheezing.    Cardiovascular:  Negative for chest pain, palpitations and leg swelling.   Gastrointestinal:  Positive for abdominal distention and abdominal pain. Negative for diarrhea and vomiting.   Musculoskeletal:  Negative for back pain and neck pain.   Skin:  Negative for rash and wound.   Neurological:  Negative for weakness.     Objective:     Vital Signs (Most Recent):  Temp: 97.5 °F (36.4 °C) (06/14/24 0527)  Pulse: 61 (06/14/24 0527)  Resp: 18 (06/14/24 0527)  BP: 139/82 (06/14/24 0527)  SpO2: 95 % (06/14/24 0527) Vital Signs (24h Range):  Temp:  [97.5 °F (36.4 °C)-98.6 °F (37 °C)] 97.5 °F (36.4 °C)  Pulse:  [61-78] 61  Resp:  [18-22] 18  SpO2:  [95 %-97 %] 95 %  BP: (134-147)/(74-84) 139/82     Weight: 106.1 kg (233 lb 14.5 oz)  Body mass index is 35.57 kg/m².     Physical Exam  Constitutional:       Appearance: He is ill-appearing.   HENT:      Nose: Nose normal.      Mouth/Throat:      Pharynx: Oropharynx is clear.   Eyes:      Extraocular Movements: Extraocular movements intact.      Pupils: Pupils are equal, round, and reactive to light.   Cardiovascular:      Rate and Rhythm: Normal rate and regular rhythm.   Pulmonary:      Effort: Pulmonary effort is normal.      Breath sounds: Normal breath sounds.   Abdominal:      Palpations: Abdomen is soft.      Tenderness: There is abdominal tenderness.      Comments:  "2 darins noted to ruq   Musculoskeletal:         General: Normal range of motion.      Cervical back: Normal range of motion.   Skin:     General: Skin is warm and dry.      Capillary Refill: Capillary refill takes less than 2 seconds.   Neurological:      General: No focal deficit present.      Mental Status: He is alert.          Significant Labs: All pertinent labs within the past 24 hours have been reviewed.  A1C:   Recent Labs   Lab 06/11/24  0511   HGBA1C 10.9*     CBC:   Recent Labs   Lab 06/13/24  0711 06/14/24  0513   WBC 9.85 7.92   HGB 10.3* 10.2*   HCT 30.3* 30.2*    236     CMP:   Recent Labs   Lab 06/13/24 0711 06/14/24 0513   * 135*   K 3.4* 3.2*    102   CO2 25 26   * 170*   BUN 12 7   CREATININE 0.7 0.5   CALCIUM 7.5* 7.8*   PROT 5.5* 5.7*   ALBUMIN 1.7* 1.7*   BILITOT 0.6 0.7   ALKPHOS 108 128   AST 98* 57*   * 139*   ANIONGAP 7 7       Significant Imaging: I have reviewed all pertinent imaging results/findings within the past 24 hours.    Assessment/Plan:      * Cholecystitis with perforation of gallbladder  Sp open cholecystectomy  Iv abx  Surgery following      Hypophosphatemia  Patient has Abnormal Phosphorus: hypophosphatemia. Will continue to monitor electrolytes closely. Will replace the affected electrolytes and repeat labs to be done after interventions completed. The patient's phosphorus results have been reviewed and are listed below.  No results for input(s): "PHOS" in the last 24 hours.     Elevated LFTs  Appears sec to recent cholecystitis - improving - monitor levels  6/14 improvign daily    Hypokalemia  Patient has hypokalemia which is Acute and currently uncontrolled. Most recent potassium levels reviewed-   Lab Results   Component Value Date    K 3.2 (L) 06/14/2024   . Will continue potassium replacement per protocol and recheck repeat levels after replacement completed.   6/14 add potassium in ivfs; po kdur bid    Type 2 diabetes mellitus " without complication, without long-term current use of insulin  Patient's FSGs are uncontrolled due to hyperglycemia on current medication regimen.  Last A1c reviewed-   Lab Results   Component Value Date    HGBA1C 10.9 (H) 06/11/2024     Most recent fingerstick glucose reviewed-   Recent Labs   Lab 06/13/24  0814   POCTGLUCOSE 220*       Current correctional scale  Low  Maintain anti-hyperglycemic dose as follows-   Antihyperglycemics (From admission, onward)      Start     Stop Route Frequency Ordered    06/14/24 0900  insulin detemir U-100 (Levemir) pen 12 Units         -- SubQ Daily 06/14/24 0724    06/12/24 1607  insulin aspart U-100 pen 0-10 Units         -- SubQ Every 6 hours PRN 06/12/24 1508          Hold Oral hypoglycemics while patient is in the hospital.    Pt is newly diagnosed dm- A1c is 10.9% - information on diet for diabetes - will start lantus 8u daily to start gettign bs better controlled  6/14 increase insulin to 12u daily      VTE Risk Mitigation (From admission, onward)           Ordered     enoxaparin injection 40 mg  Every 24 hours         06/13/24 0905     IP VTE LOW RISK PATIENT  Once         06/11/24 0651     Place JORJE hose  Until discontinued         06/11/24 0651                    Discharge Planning   ROMEO:      Code Status: Full Code   Is the patient medically ready for discharge?:     Reason for patient still in hospital (select all that apply): Treatment  Discharge Plan A: Home   Discharge Delays: None known at this time              Avani Martinez MD  Department of Hospital Medicine   [facility]

## 2024-06-15 VITALS
TEMPERATURE: 98 F | DIASTOLIC BLOOD PRESSURE: 77 MMHG | HEIGHT: 68 IN | HEART RATE: 55 BPM | RESPIRATION RATE: 20 BRPM | OXYGEN SATURATION: 95 % | SYSTOLIC BLOOD PRESSURE: 169 MMHG | WEIGHT: 233.94 LBS | BODY MASS INDEX: 35.45 KG/M2

## 2024-06-15 PROBLEM — R79.89 ELEVATED LFTS: Status: RESOLVED | Noted: 2024-06-13 | Resolved: 2024-06-15

## 2024-06-15 PROBLEM — E83.39 HYPOPHOSPHATEMIA: Status: RESOLVED | Noted: 2024-06-13 | Resolved: 2024-06-15

## 2024-06-15 PROBLEM — K82.A2 CHOLECYSTITIS WITH PERFORATION OF GALLBLADDER: Status: RESOLVED | Noted: 2024-06-11 | Resolved: 2024-06-15

## 2024-06-15 PROBLEM — E87.6 HYPOKALEMIA: Status: RESOLVED | Noted: 2024-06-13 | Resolved: 2024-06-15

## 2024-06-15 LAB
ALBUMIN SERPL BCP-MCNC: 1.9 G/DL (ref 3.5–5.2)
ALP SERPL-CCNC: 143 U/L (ref 55–135)
ALT SERPL W/O P-5'-P-CCNC: 115 U/L (ref 10–44)
ANION GAP SERPL CALC-SCNC: 8 MMOL/L (ref 3–11)
AST SERPL-CCNC: 43 U/L (ref 10–40)
BILIRUB SERPL-MCNC: 0.5 MG/DL (ref 0.1–1)
BUN SERPL-MCNC: 5 MG/DL (ref 6–20)
CALCIUM SERPL-MCNC: 8.1 MG/DL (ref 8.7–10.5)
CHLORIDE SERPL-SCNC: 102 MMOL/L (ref 95–110)
CO2 SERPL-SCNC: 26 MMOL/L (ref 23–29)
CREAT SERPL-MCNC: 0.6 MG/DL (ref 0.5–1.4)
ERYTHROCYTE [DISTWIDTH] IN BLOOD BY AUTOMATED COUNT: 13.3 % (ref 11.5–14.5)
EST. GFR  (NO RACE VARIABLE): >60 ML/MIN/1.73 M^2
GLUCOSE SERPL-MCNC: 204 MG/DL (ref 70–110)
HCT VFR BLD AUTO: 31.2 % (ref 40–54)
HGB BLD-MCNC: 10.5 G/DL (ref 14–18)
MCH RBC QN AUTO: 27.2 PG (ref 27–31)
MCHC RBC AUTO-ENTMCNC: 33.7 G/DL (ref 32–36)
MCV RBC AUTO: 81 FL (ref 82–98)
PHOSPHATE SERPL-MCNC: 3.8 MG/DL (ref 2.7–4.5)
PLATELET # BLD AUTO: 281 K/UL (ref 150–450)
PMV BLD AUTO: 10.9 FL (ref 9.2–12.9)
POCT GLUCOSE: 253 MG/DL (ref 70–110)
POTASSIUM SERPL-SCNC: 3.5 MMOL/L (ref 3.5–5.1)
PROT SERPL-MCNC: 6.1 G/DL (ref 6–8.4)
RBC # BLD AUTO: 3.86 M/UL (ref 4.6–6.2)
SODIUM SERPL-SCNC: 136 MMOL/L (ref 136–145)
WBC # BLD AUTO: 7.5 K/UL (ref 3.9–12.7)

## 2024-06-15 PROCEDURE — 25000003 PHARM REV CODE 250: Performed by: STUDENT IN AN ORGANIZED HEALTH CARE EDUCATION/TRAINING PROGRAM

## 2024-06-15 PROCEDURE — 27000221 HC OXYGEN, UP TO 24 HOURS

## 2024-06-15 PROCEDURE — 25000242 PHARM REV CODE 250 ALT 637 W/ HCPCS: Performed by: STUDENT IN AN ORGANIZED HEALTH CARE EDUCATION/TRAINING PROGRAM

## 2024-06-15 PROCEDURE — 94761 N-INVAS EAR/PLS OXIMETRY MLT: CPT

## 2024-06-15 PROCEDURE — 84100 ASSAY OF PHOSPHORUS: CPT

## 2024-06-15 PROCEDURE — 94640 AIRWAY INHALATION TREATMENT: CPT

## 2024-06-15 PROCEDURE — 99900035 HC TECH TIME PER 15 MIN (STAT)

## 2024-06-15 PROCEDURE — 25000003 PHARM REV CODE 250: Performed by: INTERNAL MEDICINE

## 2024-06-15 PROCEDURE — 36415 COLL VENOUS BLD VENIPUNCTURE: CPT

## 2024-06-15 PROCEDURE — 63600175 PHARM REV CODE 636 W HCPCS: Performed by: STUDENT IN AN ORGANIZED HEALTH CARE EDUCATION/TRAINING PROGRAM

## 2024-06-15 PROCEDURE — 94799 UNLISTED PULMONARY SVC/PX: CPT

## 2024-06-15 PROCEDURE — 85027 COMPLETE CBC AUTOMATED: CPT

## 2024-06-15 PROCEDURE — 80053 COMPREHEN METABOLIC PANEL: CPT

## 2024-06-15 PROCEDURE — 99900031 HC PATIENT EDUCATION (STAT)

## 2024-06-15 PROCEDURE — 63600175 PHARM REV CODE 636 W HCPCS: Performed by: INTERNAL MEDICINE

## 2024-06-15 RX ORDER — METHOCARBAMOL 1000 MG/1
1000 TABLET, COATED ORAL 4 TIMES DAILY
Qty: 40 TABLET | Refills: 0 | Status: SHIPPED | OUTPATIENT
Start: 2024-06-15 | End: 2024-06-25

## 2024-06-15 RX ORDER — HYDROCODONE BITARTRATE AND ACETAMINOPHEN 10; 325 MG/1; MG/1
1 TABLET ORAL EVERY 6 HOURS PRN
Qty: 28 TABLET | Refills: 0 | Status: SHIPPED | OUTPATIENT
Start: 2024-06-15 | End: 2024-06-22

## 2024-06-15 RX ORDER — METRONIDAZOLE 500 MG/1
500 TABLET ORAL EVERY 8 HOURS
Qty: 30 TABLET | Refills: 0 | Status: SHIPPED | OUTPATIENT
Start: 2024-06-15 | End: 2024-06-25

## 2024-06-15 RX ORDER — ONDANSETRON 4 MG/1
4 TABLET, FILM COATED ORAL EVERY 6 HOURS PRN
Qty: 40 TABLET | Refills: 0 | Status: SHIPPED | OUTPATIENT
Start: 2024-06-15 | End: 2024-06-25

## 2024-06-15 RX ORDER — KETOROLAC TROMETHAMINE 10 MG/1
10 TABLET, FILM COATED ORAL EVERY 6 HOURS
Qty: 20 TABLET | Refills: 0 | Status: SHIPPED | OUTPATIENT
Start: 2024-06-15 | End: 2024-06-20

## 2024-06-15 RX ORDER — CIPROFLOXACIN 500 MG/1
500 TABLET ORAL EVERY 12 HOURS
Qty: 20 TABLET | Refills: 0 | Status: SHIPPED | OUTPATIENT
Start: 2024-06-15 | End: 2024-06-25

## 2024-06-15 RX ADMIN — POTASSIUM CHLORIDE 40 MEQ: 1500 TABLET, EXTENDED RELEASE ORAL at 08:06

## 2024-06-15 RX ADMIN — FAMOTIDINE 20 MG: 10 INJECTION, SOLUTION INTRAVENOUS at 08:06

## 2024-06-15 RX ADMIN — IPRATROPIUM BROMIDE AND ALBUTEROL SULFATE 3 ML: 2.5; .5 SOLUTION RESPIRATORY (INHALATION) at 07:06

## 2024-06-15 RX ADMIN — METHOCARBAMOL 1000 MG: 500 TABLET ORAL at 12:06

## 2024-06-15 RX ADMIN — METHOCARBAMOL 1000 MG: 500 TABLET ORAL at 08:06

## 2024-06-15 RX ADMIN — KETOROLAC TROMETHAMINE 15 MG: 30 INJECTION, SOLUTION INTRAMUSCULAR; INTRAVENOUS at 05:06

## 2024-06-15 RX ADMIN — HYDROCODONE BITARTRATE AND ACETAMINOPHEN 1 TABLET: 10; 325 TABLET ORAL at 11:06

## 2024-06-15 RX ADMIN — IPRATROPIUM BROMIDE AND ALBUTEROL SULFATE 3 ML: 2.5; .5 SOLUTION RESPIRATORY (INHALATION) at 01:06

## 2024-06-15 RX ADMIN — KETOROLAC TROMETHAMINE 15 MG: 30 INJECTION, SOLUTION INTRAMUSCULAR; INTRAVENOUS at 11:06

## 2024-06-15 RX ADMIN — MORPHINE SULFATE 4 MG: 4 INJECTION, SOLUTION INTRAMUSCULAR; INTRAVENOUS at 12:06

## 2024-06-15 RX ADMIN — MUPIROCIN: 20 OINTMENT TOPICAL at 08:06

## 2024-06-15 RX ADMIN — SODIUM CHLORIDE AND POTASSIUM CHLORIDE: .9; .15 SOLUTION INTRAVENOUS at 06:06

## 2024-06-15 RX ADMIN — POLYETHYLENE GLYCOL (3350) 17 G: 17 POWDER, FOR SOLUTION ORAL at 08:06

## 2024-06-15 RX ADMIN — HYDROCODONE BITARTRATE AND ACETAMINOPHEN 1 TABLET: 10; 325 TABLET ORAL at 04:06

## 2024-06-15 NOTE — DISCHARGE SUMMARY
[facility]  General Surgery  Discharge Summary      Patient Name: Justino Fortune  MRN: [id number]  Admission Date: 6/11/2024  Hospital Length of Stay: 3 days  Discharge Date and Time:  06/15/2024 2:53 PM  Attending Physician: Kary Ackerman MD   Discharging Provider: Kary Ackerman MD  Primary Care Provider: Sayda Primary Doctor    HPI:   39-year-old male who presents with 2 day history of severe right upper quadrant pain and nausea.  Has history of self-reported gastritis but thinks it may have been his gallbladder.  In emergency department, CT abdomen and pelvis demonstrated supra active cholecystitis with inflammation of the adjacent liver.  WBC within normal limits.  T bili 1.2.  ALP elevated; glucose 333 - no reported history of diabetes.  General surgery consulted for evaluation.    Procedure(s) (LRB):  CHOLECYSTECTOMY, LAPAROSCOPIC (N/A)  CHOLECYSTECTOMY (N/A)      Indwelling Lines/Drains at time of discharge:   Lines/Drains/Airways       Drain  Duration                  Closed/Suction Drain 06/11/24 1315 Tube - 1 Inferior;Midline Abdomen Bulb 15 Fr. 4 days         Closed/Suction Drain 06/11/24 1826 Tube - 2 Right RUQ Bulb 15 Fr. 3 days                  Hospital Course: 06/13 CP: S/p open cholecystectomy POD#2. Abdominal pain moderately controlled. Bilateral SANDRA drains with serosanguinous drainage. Tbili 0.6. LFTs trending down. Electrolyte abnormalities noted. Stable for transfer to [facility] surg unit.   On POD #4, pelvic and subhepatic drains removed.  No bilious output after resuming patient on PO diet.  Pain controlled with PO medication and deemed eligible for discharge.  DM education reviewed with patient and translated instructions given.  To follow up with general surgery in a week for follow up.     Goals of Care Treatment Preferences:  Code Status: Full Code      Consults:   Consults (From admission, onward)          Status Ordering Provider     Inpatient consult to Registered  Dietitian/Nutritionist  Once        Provider:  (Not yet assigned)    Completed DOROTHY MARTINEZ     Inpatient consult to Internal Medicine  Once        Provider:  Dorothy Martinez MD    Acknowledged GT MCGREGOR            Significant Diagnostic Studies:   See above    Pending Diagnostic Studies:       Procedure Component Value Units Date/Time    Specimen to Pathology, Surgery General Surgery [[id number]] Collected: 06/11/24 1800    Order Status: Sent Lab Status: In process Updated: 06/11/24 2003    Specimen: Tissue           Final Active Diagnoses:    Diagnosis Date Noted POA    Type 2 diabetes mellitus without complication, without long-term current use of insulin [E11.9] 06/12/2024 Yes      Problems Resolved During this Admission:    Diagnosis Date Noted Date Resolved POA    PRINCIPAL PROBLEM:  Cholecystitis with perforation of gallbladder [K82.A2] 06/11/2024 06/15/2024 Yes    Hypokalemia [E87.6] 06/13/2024 06/15/2024 No    Elevated LFTs [R79.89] 06/13/2024 06/15/2024 Yes    Hypophosphatemia [E83.39] 06/13/2024 06/15/2024 Yes      Discharged Condition: good    Disposition: Home or Self Care    Follow Up:    Patient Instructions:      Diet diabetic     Lifting restrictions   Order Comments: No lifting, pushing, or pulling greater than 10lbs for 6 weeks to reduce risk of hernia   You may return to work in one week if you are able to adhere to the lifting restriction above  If you are unable to perform your regular duties with the restrictions, please contact Dr. Ackerman's office     Notify your health care provider if you experience any of the following:  temperature >100.4     Notify your health care provider if you experience any of the following:  persistent nausea and vomiting or diarrhea     Notify your health care provider if you experience any of the following:  severe uncontrolled pain     Notify your health care provider if you experience any of the following:  redness, tenderness, or signs of  infection (pain, swelling, redness, odor or green/yellow discharge around incision site)     Change dressing (specify)   Order Comments: Remove white dressing over drain sites before showering  Shower daily and allow soapy water to run over incisions  Cover drain sites (wounds without staples) with white material provided and cover with bandage   Gently paint all incisions with staples with a thin layer of betadine (given at discharge).  You may choose to cover these incisions with bandages to keep staples from snagging on clothing  Staples will be removed at follow up visit by Dr. Ackerman   Do not scrub or submerge in water for two weeks     Activity as tolerated     Medications:  Reconciled Home Medications:      Medication List        START taking these medications      HYDROcodone-acetaminophen  mg per tablet  Commonly known as: NORCO  Take 1 tablet by mouth every 6 (six) hours as needed for Pain.     ketorolac 10 mg tablet  Commonly known as: TORADOL  Take 1 tablet (10 mg total) by mouth every 6 (six) hours. for 5 days     methocarbamoL 1,000 mg Tab  Take 1,000 mg by mouth 4 (four) times daily. for 10 days     ondansetron 4 MG tablet  Commonly known as: ZOFRAN  Take 1 tablet (4 mg total) by mouth every 6 (six) hours as needed for Nausea.            Time spent on the discharge of patient: 10 minutes    Kary Ackerman MD  General Surgery  [facility]

## 2024-06-15 NOTE — ASSESSMENT & PLAN NOTE
Patient's FSGs are uncontrolled due to hyperglycemia on current medication regimen.  Last A1c reviewed-   Lab Results   Component Value Date    HGBA1C 10.9 (H) 06/11/2024     Most recent fingerstick glucose reviewed-   Recent Labs   Lab 06/14/24  2349   POCTGLUCOSE 224*       Current correctional scale  Low  Maintain anti-hyperglycemic dose as follows-   Antihyperglycemics (From admission, onward)      Start     Stop Route Frequency Ordered    06/14/24 0900  insulin detemir U-100 (Levemir) pen 12 Units         -- SubQ Daily 06/14/24 0724    06/12/24 1607  insulin aspart U-100 pen 0-10 Units         -- SubQ Every 6 hours PRN 06/12/24 1508          Hold Oral hypoglycemics while patient is in the hospital.    Pt is newly diagnosed dm- A1c is 10.9% - information on diet for diabetes - will start lantus 8u daily to start gettign bs better controlled  6/14 increase insulin to 12u daily  6/15: Adjusted 20 units daily

## 2024-06-15 NOTE — ASSESSMENT & PLAN NOTE
Patient has hypokalemia which is Acute and currently uncontrolled. Most recent potassium levels reviewed-   Lab Results   Component Value Date    K 3.5 06/15/2024   . Will continue potassium replacement per protocol and recheck repeat levels after replacement completed.   6/14 add potassium in ivfs; po kdur bid

## 2024-06-15 NOTE — SUBJECTIVE & OBJECTIVE
Past Medical History:   Diagnosis Date    Gastritis        Past Surgical History:   Procedure Laterality Date    CHOLECYSTECTOMY N/A 6/11/2024    Procedure: CHOLECYSTECTOMY;  Surgeon: Kary Ackerman MD;  Location: [facility] OR;  Service: General;  Laterality: N/A;  Partial    LAPAROSCOPIC CHOLECYSTECTOMY N/A 6/11/2024    Procedure: CHOLECYSTECTOMY, LAPAROSCOPIC;  Surgeon: Kary Ackerman MD;  Location: [facility] OR;  Service: General;  Laterality: N/A;  CONVERTED TO OPEN       Review of patient's allergies indicates:  No Known Allergies    No current facility-administered medications on file prior to encounter.     No current outpatient medications on file prior to encounter.     Family History    None       Tobacco Use    Smoking status: Not on file    Smokeless tobacco: Not on file   Substance and Sexual Activity    Alcohol use: Not on file    Drug use: Not on file    Sexual activity: Not on file     Review of Systems   Constitutional:  Negative for activity change, fatigue and fever.   HENT:  Negative for mouth sores, sinus pressure and sore throat.    Respiratory:  Negative for cough, shortness of breath and wheezing.    Cardiovascular:  Negative for chest pain, palpitations and leg swelling.   Gastrointestinal:  Positive for abdominal distention and abdominal pain. Negative for diarrhea and vomiting.   Musculoskeletal:  Negative for back pain and neck pain.   Skin:  Negative for rash and wound.   Neurological:  Negative for weakness.     Objective:     Vital Signs (Most Recent):  Temp: 98.2 °F (36.8 °C) (06/15/24 1135)  Pulse: 64 (06/15/24 1135)  Resp: 18 (06/15/24 1151)  BP: (!) 169/77 (06/15/24 1135)  SpO2: 96 % (06/15/24 1135) Vital Signs (24h Range):  Temp:  [98.1 °F (36.7 °C)-98.8 °F (37.1 °C)] 98.2 °F (36.8 °C)  Pulse:  [55-81] 64  Resp:  [12-24] 18  SpO2:  [92 %-97 %] 96 %  BP: (131-169)/(77-90) 169/77     Weight: 106.1 kg (233 lb 14.5 oz)  Body mass index is 35.57 kg/m².     Physical Exam  Constitutional:        Appearance: He is ill-appearing.   HENT:      Nose: Nose normal.      Mouth/Throat:      Pharynx: Oropharynx is clear.   Eyes:      Extraocular Movements: Extraocular movements intact.      Pupils: Pupils are equal, round, and reactive to light.   Cardiovascular:      Rate and Rhythm: Normal rate and regular rhythm.   Pulmonary:      Effort: Pulmonary effort is normal.      Breath sounds: Normal breath sounds.   Abdominal:      Palpations: Abdomen is soft.      Tenderness: There is abdominal tenderness.      Comments: 2 darins noted to ruq   Musculoskeletal:         General: Normal range of motion.      Cervical back: Normal range of motion.   Skin:     General: Skin is warm and dry.      Capillary Refill: Capillary refill takes less than 2 seconds.   Neurological:      General: No focal deficit present.      Mental Status: He is alert.          Significant Labs: All pertinent labs within the past 24 hours have been reviewed.  A1C:   Recent Labs   Lab 06/11/24  0511   HGBA1C 10.9*     CBC:   Recent Labs   Lab 06/14/24  0513 06/15/24  0602   WBC 7.92 7.50   HGB 10.2* 10.5*   HCT 30.2* 31.2*    281     CMP:   Recent Labs   Lab 06/14/24  0513 06/15/24  0602   * 136   K 3.2* 3.5    102   CO2 26 26   * 204*   BUN 7 5*   CREATININE 0.5 0.6   CALCIUM 7.8* 8.1*   PROT 5.7* 6.1   ALBUMIN 1.7* 1.9*   BILITOT 0.7 0.5   ALKPHOS 128 143*   AST 57* 43*   * 115*   ANIONGAP 7 8       Significant Imaging: I have reviewed all pertinent imaging results/findings within the past 24 hours.

## 2024-06-15 NOTE — PLAN OF CARE
Plan of care reviewed. Patient remained free of falls and tolerated all medications this shift. Right sided IV in place, patent, flushed and saline locked. Left side IV in place and infusing  0.9 @ 150 ml/hr.No redness, swelling or drainage noted. A/O x.4 Patient is in bed resting. Visible rise and fall noted. No signs of acute stress. RA. Provider added duo-nebs throughout night due to sob and complaints of congestion. Much improvement noted.  Independent.  Continent to bowel and bladder. Franklyn drains in place and draining well. Translater used for language barrier. Denies pain at this time.  Call bell and remote in reach. Bed locked and in lowest position. All belongings in reach. No further concerns at this time.     Problem: Adult Inpatient Plan of Care  Goal: Plan of Care Review  Outcome: Progressing  Goal: Patient-Specific Goal (Individualized)  Outcome: Progressing  Goal: Absence of Hospital-Acquired Illness or Injury  Outcome: Progressing  Goal: Optimal Comfort and Wellbeing  Outcome: Progressing  Goal: Readiness for Transition of Care  Outcome: Progressing     Problem: Pain Acute  Goal: Optimal Pain Control and Function  Outcome: Progressing     Problem: Cholecystectomy  Goal: Absence of Bleeding  Outcome: Progressing  Goal: Effective Bowel Elimination  Outcome: Progressing  Goal: Fluid and Electrolyte Balance  Outcome: Progressing  Goal: Absence of Infection Signs and Symptoms  Outcome: Progressing  Goal: Anesthesia/Sedation Recovery  Outcome: Progressing  Goal: Pain Control and Function  Outcome: Progressing  Goal: Nausea and Vomiting Relief  Outcome: Progressing  Goal: Effective Urinary Elimination  Outcome: Progressing  Goal: Effective Oxygenation and Ventilation  Outcome: Progressing     Problem: Infection  Goal: Absence of Infection Signs and Symptoms  Outcome: Progressing     Problem: Wound  Goal: Optimal Coping  Outcome: Progressing  Goal: Optimal Functional Ability  Outcome: Progressing  Goal: Absence  of Infection Signs and Symptoms  Outcome: Progressing  Goal: Improved Oral Intake  Outcome: Progressing  Goal: Optimal Pain Control and Function  Outcome: Progressing  Goal: Skin Health and Integrity  Outcome: Progressing  Goal: Optimal Wound Healing  Outcome: Progressing     Problem: Skin Injury Risk Increased  Goal: Skin Health and Integrity  Outcome: Progressing     Problem: Fall Injury Risk  Goal: Absence of Fall and Fall-Related Injury  Outcome: Progressing     Problem: Diabetes Comorbidity  Goal: Blood Glucose Level Within Targeted Range  Outcome: Progressing

## 2024-06-15 NOTE — PROGRESS NOTES
[facility] Medicine  Progress Note    Patient Name: Justino Fortune  MRN: [id number]  Patient Class: IP- Inpatient   Admission Date: 6/11/2024  Length of Stay: 3 days  Attending Physician: Kary Ackerman MD  Primary Care Provider: Sayda, Primary Doctor        Subjective:     Principal Problem:Cholecystitis with perforation of gallbladder        HPI:  39-year-old male who presents with 2 day history of severe right upper quadrant pain and nausea. Has history of self-reported gastritis but thinks it may have been his gallbladder. In emergency department, CT abdomen and pelvis demonstrated supra active cholecystitis with inflammation of the adjacent liver. WBC within normal limits. T bili 1.2. ALP elevated; glucose 333 - no reported history of diabetes. General surgery consulted for evaluation   PT started on iv abxs, npo status.  Pt had surgery on 6/11/24 with an open cholecystectomy - found to have puralence and perforated gallbladder with gallstones.  This am, pt is feelign ok - some mild abd pain.    Explained to pt that he is a newly diagnosed diabetic (A1c is 10.9) - dicussed some changes he will need to in his diet when able to eat - will start low dose insulin with ssi for now to lower bs        Overview/Hospital Course:  6/14 tolerating clear liquid diet - does have some ruq abd pain but better than before surgery.   Cont to replace potassium and adjust insulin therapy    6/15:  No acute events overnight.  The patient is responding nicely to current treatment regimen.  Titrate as needed.    Past Medical History:   Diagnosis Date    Gastritis        Past Surgical History:   Procedure Laterality Date    CHOLECYSTECTOMY N/A 6/11/2024    Procedure: CHOLECYSTECTOMY;  Surgeon: Kary Ackerman MD;  Location: [facility];  Service: General;  Laterality: N/A;  Partial    LAPAROSCOPIC CHOLECYSTECTOMY N/A 6/11/2024    Procedure: CHOLECYSTECTOMY, LAPAROSCOPIC;  Surgeon: Kary Ackerman MD;   Location: [facility] OR;  Service: General;  Laterality: N/A;  CONVERTED TO OPEN       Review of patient's allergies indicates:  No Known Allergies    No current facility-administered medications on file prior to encounter.     No current outpatient medications on file prior to encounter.     Family History    None       Tobacco Use    Smoking status: Not on file    Smokeless tobacco: Not on file   Substance and Sexual Activity    Alcohol use: Not on file    Drug use: Not on file    Sexual activity: Not on file     Review of Systems   Constitutional:  Negative for activity change, fatigue and fever.   HENT:  Negative for mouth sores, sinus pressure and sore throat.    Respiratory:  Negative for cough, shortness of breath and wheezing.    Cardiovascular:  Negative for chest pain, palpitations and leg swelling.   Gastrointestinal:  Positive for abdominal distention and abdominal pain. Negative for diarrhea and vomiting.   Musculoskeletal:  Negative for back pain and neck pain.   Skin:  Negative for rash and wound.   Neurological:  Negative for weakness.     Objective:     Vital Signs (Most Recent):  Temp: 98.2 °F (36.8 °C) (06/15/24 1135)  Pulse: 64 (06/15/24 1135)  Resp: 18 (06/15/24 1151)  BP: (!) 169/77 (06/15/24 1135)  SpO2: 96 % (06/15/24 1135) Vital Signs (24h Range):  Temp:  [98.1 °F (36.7 °C)-98.8 °F (37.1 °C)] 98.2 °F (36.8 °C)  Pulse:  [55-81] 64  Resp:  [12-24] 18  SpO2:  [92 %-97 %] 96 %  BP: (131-169)/(77-90) 169/77     Weight: 106.1 kg (233 lb 14.5 oz)  Body mass index is 35.57 kg/m².     Physical Exam  Constitutional:       Appearance: He is ill-appearing.   HENT:      Nose: Nose normal.      Mouth/Throat:      Pharynx: Oropharynx is clear.   Eyes:      Extraocular Movements: Extraocular movements intact.      Pupils: Pupils are equal, round, and reactive to light.   Cardiovascular:      Rate and Rhythm: Normal rate and regular rhythm.   Pulmonary:      Effort: Pulmonary effort is normal.      Breath sounds:  Normal breath sounds.   Abdominal:      Palpations: Abdomen is soft.      Tenderness: There is abdominal tenderness.      Comments: 2 darins noted to ruq   Musculoskeletal:         General: Normal range of motion.      Cervical back: Normal range of motion.   Skin:     General: Skin is warm and dry.      Capillary Refill: Capillary refill takes less than 2 seconds.   Neurological:      General: No focal deficit present.      Mental Status: He is alert.          Significant Labs: All pertinent labs within the past 24 hours have been reviewed.  A1C:   Recent Labs   Lab 06/11/24  0511   HGBA1C 10.9*     CBC:   Recent Labs   Lab 06/14/24  0513 06/15/24  0602   WBC 7.92 7.50   HGB 10.2* 10.5*   HCT 30.2* 31.2*    281     CMP:   Recent Labs   Lab 06/14/24  0513 06/15/24  0602   * 136   K 3.2* 3.5    102   CO2 26 26   * 204*   BUN 7 5*   CREATININE 0.5 0.6   CALCIUM 7.8* 8.1*   PROT 5.7* 6.1   ALBUMIN 1.7* 1.9*   BILITOT 0.7 0.5   ALKPHOS 128 143*   AST 57* 43*   * 115*   ANIONGAP 7 8       Significant Imaging: I have reviewed all pertinent imaging results/findings within the past 24 hours.    Assessment/Plan:      * Cholecystitis with perforation of gallbladder  Sp open cholecystectomy  Iv abx  Surgery following      Hypophosphatemia  Patient has Abnormal Phosphorus: hypophosphatemia. Will continue to monitor electrolytes closely. Will replace the affected electrolytes and repeat labs to be done after interventions completed. The patient's phosphorus results have been reviewed and are listed below.  Recent Labs   Lab 06/15/24  0602   PHOS 3.8        Elevated LFTs  Appears sec to recent cholecystitis - improving - monitor levels  6/14 improvign daily    Hypokalemia  Patient has hypokalemia which is Acute and currently uncontrolled. Most recent potassium levels reviewed-   Lab Results   Component Value Date    K 3.5 06/15/2024   . Will continue potassium replacement per protocol and recheck  repeat levels after replacement completed.   6/14 add potassium in ivfs; po kdur bid    Type 2 diabetes mellitus without complication, without long-term current use of insulin  Patient's FSGs are uncontrolled due to hyperglycemia on current medication regimen.  Last A1c reviewed-   Lab Results   Component Value Date    HGBA1C 10.9 (H) 06/11/2024     Most recent fingerstick glucose reviewed-   Recent Labs   Lab 06/14/24  2349   POCTGLUCOSE 224*       Current correctional scale  Low  Maintain anti-hyperglycemic dose as follows-   Antihyperglycemics (From admission, onward)      Start     Stop Route Frequency Ordered    06/14/24 0900  insulin detemir U-100 (Levemir) pen 12 Units         -- SubQ Daily 06/14/24 0724    06/12/24 1607  insulin aspart U-100 pen 0-10 Units         -- SubQ Every 6 hours PRN 06/12/24 1508          Hold Oral hypoglycemics while patient is in the hospital.    Pt is newly diagnosed dm- A1c is 10.9% - information on diet for diabetes - will start lantus 8u daily to start gettign bs better controlled  6/14 increase insulin to 12u daily  6/15: Adjusted 20 units daily      VTE Risk Mitigation (From admission, onward)           Ordered     enoxaparin injection 40 mg  Every 24 hours         06/13/24 0905     IP VTE LOW RISK PATIENT  Once         06/11/24 0651     Place JORJE hose  Until discontinued         06/11/24 0651                    Discharge Planning   ROMEO:      Code Status: Full Code   Is the patient medically ready for discharge?:     Reason for patient still in hospital (select all that apply): Treatment  Discharge Plan A: Home   Discharge Delays: None known at this time              Bayron Skaggs Jr, MD  Department of Hospital Medicine   [facility]

## 2024-06-15 NOTE — ASSESSMENT & PLAN NOTE
Patient has Abnormal Phosphorus: hypophosphatemia. Will continue to monitor electrolytes closely. Will replace the affected electrolytes and repeat labs to be done after interventions completed. The patient's phosphorus results have been reviewed and are listed below.  Recent Labs   Lab 06/15/24  0602   PHOS 3.8

## 2024-06-17 LAB — BACTERIA SPEC ANAEROBE CULT: NORMAL

## 2024-06-18 ENCOUNTER — TELEPHONE (OUTPATIENT)
Dept: SURGERY | Facility: CLINIC | Age: 39
End: 2024-06-18
Payer: MEDICAID

## 2024-06-18 LAB — BACTERIA SPEC AEROBE CULT: ABNORMAL

## 2024-06-18 NOTE — TELEPHONE ENCOUNTER
"Attempted to call using the [facility] language line to schedule a post op appointment from dany gilmore on 6/11, no answer, voicemail not setup, called relative "kenia" and he answered but hung up on myself and .   "

## 2024-06-21 LAB — SPECIMEN TO PATHOLOGY - SURGICAL: NORMAL

## 2024-06-25 ENCOUNTER — OFFICE VISIT (OUTPATIENT)
Dept: SURGERY | Facility: CLINIC | Age: 39
End: 2024-06-25
Payer: MEDICAID

## 2024-06-25 VITALS
HEART RATE: 94 BPM | SYSTOLIC BLOOD PRESSURE: 113 MMHG | WEIGHT: 210.31 LBS | BODY MASS INDEX: 31.98 KG/M2 | DIASTOLIC BLOOD PRESSURE: 80 MMHG | OXYGEN SATURATION: 94 %

## 2024-06-25 DIAGNOSIS — K82.A2 PERFORATION OF GALLBLADDER IN CHOLECYSTITIS: ICD-10-CM

## 2024-06-25 DIAGNOSIS — K81.0 ACUTE GANGRENOUS CHOLECYSTITIS: ICD-10-CM

## 2024-06-25 DIAGNOSIS — Z90.49 S/P CHOLECYSTECTOMY: Primary | ICD-10-CM

## 2024-06-25 PROCEDURE — 99212 OFFICE O/P EST SF 10 MIN: CPT | Mod: PBBFAC

## 2024-06-25 PROCEDURE — 99999 PR PBB SHADOW E&M-EST. PATIENT-LVL II: CPT | Mod: PBBFAC,,,

## 2024-06-25 NOTE — PROGRESS NOTES
[facility]  General Surgery Clinic Progress Note    HPI:  Justino Fortune is a 39 y.o. male with history of perforated cholecystitis s/p cholecystectomy on 6/11 who presents for follow up. Pt reports some abdominal soreness. Voices no other complaints. Tolerating diet well. Denies any current pain med use.    ROS:  Negative except above    PE:  Vitals:    06/25/24 1047   BP: 113/80   Pulse: 94       Gen: Alert and oriented x3, judgement intact, NAD  CV: RRR  Resp: CTAB; breaths non-labored and symmetrical  Abd: Soft, NT, ND, BS+; incisions healing well, no SOI, reactive erythema to staples, staples intact, incisions with approximated edges  Extremities: No c/c/e    Pathology: See pathology report. Unable to attach.     A/P:  39 y.o. male with history of perforated cholecystitis s/p cholecystectomy who presents for postop follow up  -Pathology benign  -Incisions healing well; staples removed from lower midline and left sided incision; every other incision removed from RUQ incision   -Low fat diet  -Activity restrictions for additional 4 weeks  -RTC 1 week      Ronal Flor NP  General Surgery   [phone]        6/25/2024

## 2024-07-01 ENCOUNTER — OFFICE VISIT (OUTPATIENT)
Dept: SURGERY | Facility: CLINIC | Age: 39
End: 2024-07-01
Payer: MEDICAID

## 2024-07-01 VITALS
BODY MASS INDEX: 33.54 KG/M2 | HEART RATE: 96 BPM | DIASTOLIC BLOOD PRESSURE: 69 MMHG | SYSTOLIC BLOOD PRESSURE: 103 MMHG | WEIGHT: 221.31 LBS | OXYGEN SATURATION: 97 % | HEIGHT: 68 IN

## 2024-07-01 DIAGNOSIS — K81.0 ACUTE GANGRENOUS CHOLECYSTITIS: ICD-10-CM

## 2024-07-01 DIAGNOSIS — Z90.49 S/P CHOLECYSTECTOMY: Primary | ICD-10-CM

## 2024-07-01 DIAGNOSIS — K82.A2 PERFORATION OF GALLBLADDER IN CHOLECYSTITIS: ICD-10-CM

## 2024-07-01 PROCEDURE — 99212 OFFICE O/P EST SF 10 MIN: CPT | Mod: PBBFAC

## 2024-07-01 PROCEDURE — 1160F RVW MEDS BY RX/DR IN RCRD: CPT | Mod: CPTII,,,

## 2024-07-01 PROCEDURE — 3074F SYST BP LT 130 MM HG: CPT | Mod: CPTII,,,

## 2024-07-01 PROCEDURE — 99999 PR PBB SHADOW E&M-EST. PATIENT-LVL II: CPT | Mod: PBBFAC,,,

## 2024-07-01 PROCEDURE — 3046F HEMOGLOBIN A1C LEVEL >9.0%: CPT | Mod: CPTII,,,

## 2024-07-01 PROCEDURE — 1159F MED LIST DOCD IN RCRD: CPT | Mod: CPTII,,,

## 2024-07-01 PROCEDURE — 99024 POSTOP FOLLOW-UP VISIT: CPT | Mod: ,,,

## 2024-07-01 PROCEDURE — 3078F DIAST BP <80 MM HG: CPT | Mod: CPTII,,,

## 2024-07-01 NOTE — PROGRESS NOTES
[facility]  General Surgery Clinic Progress Note    HPI:  Justino Fortune is a 39 y.o. male with history of perforated cholecystitis s/p cholecystectomy on 6/11 who presents for follow up. Pt reports some abdominal soreness. Voices no other complaints. Tolerating diet well. Denies any current pain med use.    ROS:  Negative except above    PE:  Vitals:    07/01/24 1116   BP: 103/69   Pulse: 96       Gen: Alert and oriented x3, judgement intact, NAD  CV: RRR  Resp: CTAB; breaths non-labored and symmetrical  Abd: Soft, NT, ND, BS+; incisions healing well, no SOI, reactive erythema to staples, staples intact, incisions with approximated edges  Extremities: No c/c/e    Pathology: See pathology report. Unable to attach.     A/P:  39 y.o. male with history of perforated cholecystitis s/p cholecystectomy who presents for postop follow up  -Pathology benign  -Incisions healing well; staples removed from lower midline and left sided incision; every other incision removed from RUQ incision   -Low fat diet  -    -RTC 1 week      Ronal Flor NP  General Surgery   [phone]        7/1/2024

## 2024-07-01 NOTE — PROGRESS NOTES
[facility]  General Surgery Clinic Progress Note    HPI:  Justino Fortune is a 39 y.o. male with history of perforated cholecystitis s/p cholecystectomy on 6/11 who presents for follow up. Voices no complaints. Tolerating diet well. Denies any current pain med use.    ROS:  Negative except above    PE:  Vitals:    07/01/24 1116   BP: 103/69   Pulse: 96       Gen: Alert and oriented x3, judgement intact, NAD  CV: RRR  Resp: CTAB; breaths non-labored and symmetrical  Abd: Soft, NT, ND, BS+; incisions healing well, no SOI, reactive erythema to remaining staples, staples intact, incisions with approximated edges  Extremities: No c/c/e    Pathology:      A/P:  39 y.o. male with history of perforated cholecystitis s/p cholecystectomy who presents for postop follow up  -Pathology benign  -Incisions healing well; staples removed from RUQ incision   -Low fat diet  -May drive  -Activity restrictions for additional 3 weeks  -RTC 3 weeks      Ronal Flor NP  General Surgery   [phone]        7/1/2024

## (undated) DEVICE — TROCAR ENDOPATH XCEL 12X100MM

## (undated) DEVICE — TUBE SUMP NASOGASTRIC 16FR

## (undated) DEVICE — ELECTRODE REM PLYHSV RETURN 9

## (undated) DEVICE — SUT ETHILON 2-0 FS 18IN BLK

## (undated) DEVICE — LINER GLOVE POWDERFREE SZ 6.5

## (undated) DEVICE — SUT PDS II 0 CT-1 VIL MONO

## (undated) DEVICE — LINER SUCTION CANNISTER REGUGA

## (undated) DEVICE — SET PNEUMOCLEAR HEAT HUM SE HF

## (undated) DEVICE — SUT 0 36IN PDS II VIO MONO

## (undated) DEVICE — TOWEL OR XRAY WHITE 17X26IN

## (undated) DEVICE — STAPLER SKIN PROXIMATE WIDE

## (undated) DEVICE — APPLIER CLIP ENDO MED/LG 10MM

## (undated) DEVICE — GLOVE SURGICAL LATEX SZ 6.5

## (undated) DEVICE — SPONGE POST-OP GAUZE 4X4IN

## (undated) DEVICE — BLANKET UPPER BODY 78.7X29.9IN

## (undated) DEVICE — BLADE ELECTRO EXTENDED.

## (undated) DEVICE — CATH MALLECOT 28FR 6EA/BX

## (undated) DEVICE — APPLIER LIGACLIP MED 11IN

## (undated) DEVICE — SYS SEE SHARP SCP ANTIFG LNG

## (undated) DEVICE — GOWN NONREINF SET-IN SLV XL

## (undated) DEVICE — SUT ENDOLOOP PDSII 18 LIGA

## (undated) DEVICE — SUT ETHIBOND 0 CR/MO-7 8-18

## (undated) DEVICE — SLEEVE SCD EXPRESS KNEE MEDIUM

## (undated) DEVICE — BELLOW CANN HEMOBLAST 1.65GR

## (undated) DEVICE — DRAPE CORETEMP FLD WRM 56X62IN

## (undated) DEVICE — BLADE SURG CARBON STEEL SZ11

## (undated) DEVICE — Device

## (undated) DEVICE — SPONGE LAP 18X18 PREWASHED

## (undated) DEVICE — SUT VICRYL COAT 910 1X36IN

## (undated) DEVICE — CLIPPER BLADE MOD 4406 (CAREF)

## (undated) DEVICE — SPONGE KITTNER 1/4X 5/8 L STRL

## (undated) DEVICE — LINER GLOVE POWDERFREE SZ 7

## (undated) DEVICE — STRAP KNEE & BODY DISP 4X34IN

## (undated) DEVICE — SOL NACL IRR 1000ML BTL

## (undated) DEVICE — SUT 2-0 ETHILON 18 FS

## (undated) DEVICE — SUT SILK 2.0 BLK 18

## (undated) DEVICE — SUT 0 60IN PDS II VIO MONO

## (undated) DEVICE — HEMOSTAT SURGICEL 4X8IN

## (undated) DEVICE — DISSECTOR 5MM ENDOPATH

## (undated) DEVICE — TROCAR ENDOPATH XCEL 5X100MM

## (undated) DEVICE — SPONGE GAUZE 16PLY 4X4

## (undated) DEVICE — IRRIGATOR HYDRO-SURG PLUS 5MM

## (undated) DEVICE — APPLICATOR CHLORAPREP ORN 26ML

## (undated) DEVICE — COVER OVERHEAD SURG LT BLUE

## (undated) DEVICE — EVACUATOR WOUND BULB 100CC

## (undated) DEVICE — BIOGEL SUPER SENSITIVE SIZE 6

## (undated) DEVICE — DRESSING MEPILEX 4X12IN

## (undated) DEVICE — TRAY CATH 1-LYR URIMTR 16FR

## (undated) DEVICE — UNDERPAD DELUXE FLUFF 30X30IN